# Patient Record
Sex: MALE | Race: ASIAN | NOT HISPANIC OR LATINO
[De-identification: names, ages, dates, MRNs, and addresses within clinical notes are randomized per-mention and may not be internally consistent; named-entity substitution may affect disease eponyms.]

---

## 2023-10-12 ENCOUNTER — LABORATORY RESULT (OUTPATIENT)
Age: 63
End: 2023-10-12

## 2023-10-12 ENCOUNTER — APPOINTMENT (OUTPATIENT)
Dept: BARIATRICS | Facility: CLINIC | Age: 63
End: 2023-10-12
Payer: COMMERCIAL

## 2023-10-12 VITALS
BODY MASS INDEX: 16.43 KG/M2 | DIASTOLIC BLOOD PRESSURE: 78 MMHG | WEIGHT: 108.38 LBS | SYSTOLIC BLOOD PRESSURE: 127 MMHG | HEIGHT: 68 IN | HEART RATE: 94 BPM | OXYGEN SATURATION: 98 % | TEMPERATURE: 98.3 F

## 2023-10-12 DIAGNOSIS — R19.00 INTRA-ABDOMINAL AND PELVIC SWELLING, MASS AND LUMP, UNSPECIFIED SITE: ICD-10-CM

## 2023-10-12 DIAGNOSIS — Z00.00 ENCOUNTER FOR GENERAL ADULT MEDICAL EXAMINATION W/OUT ABNORMAL FINDINGS: ICD-10-CM

## 2023-10-12 PROCEDURE — 99204 OFFICE O/P NEW MOD 45 MIN: CPT

## 2023-10-22 ENCOUNTER — INPATIENT (INPATIENT)
Facility: HOSPITAL | Age: 63
LOS: 4 days | Discharge: HOME | DRG: 374 | End: 2023-10-27
Attending: STUDENT IN AN ORGANIZED HEALTH CARE EDUCATION/TRAINING PROGRAM | Admitting: STUDENT IN AN ORGANIZED HEALTH CARE EDUCATION/TRAINING PROGRAM
Payer: COMMERCIAL

## 2023-10-22 VITALS
DIASTOLIC BLOOD PRESSURE: 84 MMHG | HEIGHT: 70 IN | TEMPERATURE: 98 F | HEART RATE: 108 BPM | SYSTOLIC BLOOD PRESSURE: 147 MMHG | RESPIRATION RATE: 18 BRPM | OXYGEN SATURATION: 98 % | WEIGHT: 110.01 LBS

## 2023-10-22 DIAGNOSIS — A41.9 SEPSIS, UNSPECIFIED ORGANISM: ICD-10-CM

## 2023-10-22 DIAGNOSIS — Z29.9 ENCOUNTER FOR PROPHYLACTIC MEASURES, UNSPECIFIED: ICD-10-CM

## 2023-10-22 DIAGNOSIS — N17.9 ACUTE KIDNEY FAILURE, UNSPECIFIED: ICD-10-CM

## 2023-10-22 DIAGNOSIS — K62.89 OTHER SPECIFIED DISEASES OF ANUS AND RECTUM: ICD-10-CM

## 2023-10-22 DIAGNOSIS — R65.10 SYSTEMIC INFLAMMATORY RESPONSE SYNDROME (SIRS) OF NON-INFECTIOUS ORIGIN WITHOUT ACUTE ORGAN DYSFUNCTION: ICD-10-CM

## 2023-10-22 DIAGNOSIS — R10.9 UNSPECIFIED ABDOMINAL PAIN: ICD-10-CM

## 2023-10-22 DIAGNOSIS — R74.01 ELEVATION OF LEVELS OF LIVER TRANSAMINASE LEVELS: ICD-10-CM

## 2023-10-22 LAB
ALBUMIN SERPL ELPH-MCNC: 3.2 G/DL — LOW (ref 3.3–5)
ALBUMIN SERPL ELPH-MCNC: 3.2 G/DL — LOW (ref 3.3–5)
ALP SERPL-CCNC: 746 U/L — HIGH (ref 40–120)
ALP SERPL-CCNC: 746 U/L — HIGH (ref 40–120)
ALT FLD-CCNC: 41 U/L — SIGNIFICANT CHANGE UP (ref 10–45)
ALT FLD-CCNC: 41 U/L — SIGNIFICANT CHANGE UP (ref 10–45)
ANION GAP SERPL CALC-SCNC: 15 MMOL/L — SIGNIFICANT CHANGE UP (ref 5–17)
ANION GAP SERPL CALC-SCNC: 15 MMOL/L — SIGNIFICANT CHANGE UP (ref 5–17)
ANISOCYTOSIS BLD QL: SLIGHT — SIGNIFICANT CHANGE UP
ANISOCYTOSIS BLD QL: SLIGHT — SIGNIFICANT CHANGE UP
APPEARANCE UR: CLEAR — SIGNIFICANT CHANGE UP
APPEARANCE UR: CLEAR — SIGNIFICANT CHANGE UP
AST SERPL-CCNC: 222 U/L — HIGH (ref 10–40)
AST SERPL-CCNC: 222 U/L — HIGH (ref 10–40)
BACTERIA # UR AUTO: ABNORMAL /HPF
BACTERIA # UR AUTO: ABNORMAL /HPF
BASOPHILS # BLD AUTO: 0 K/UL — SIGNIFICANT CHANGE UP (ref 0–0.2)
BASOPHILS # BLD AUTO: 0 K/UL — SIGNIFICANT CHANGE UP (ref 0–0.2)
BASOPHILS NFR BLD AUTO: 0 % — SIGNIFICANT CHANGE UP (ref 0–2)
BASOPHILS NFR BLD AUTO: 0 % — SIGNIFICANT CHANGE UP (ref 0–2)
BILIRUB SERPL-MCNC: 1.8 MG/DL — HIGH (ref 0.2–1.2)
BILIRUB SERPL-MCNC: 1.8 MG/DL — HIGH (ref 0.2–1.2)
BILIRUB UR-MCNC: NEGATIVE — SIGNIFICANT CHANGE UP
BILIRUB UR-MCNC: NEGATIVE — SIGNIFICANT CHANGE UP
BUN SERPL-MCNC: 23 MG/DL — SIGNIFICANT CHANGE UP (ref 7–23)
BUN SERPL-MCNC: 23 MG/DL — SIGNIFICANT CHANGE UP (ref 7–23)
CALCIUM SERPL-MCNC: 9.3 MG/DL — SIGNIFICANT CHANGE UP (ref 8.4–10.5)
CALCIUM SERPL-MCNC: 9.3 MG/DL — SIGNIFICANT CHANGE UP (ref 8.4–10.5)
CAST: 13 /LPF — HIGH (ref 0–4)
CAST: 13 /LPF — HIGH (ref 0–4)
CHLORIDE SERPL-SCNC: 95 MMOL/L — LOW (ref 96–108)
CHLORIDE SERPL-SCNC: 95 MMOL/L — LOW (ref 96–108)
CO2 SERPL-SCNC: 23 MMOL/L — SIGNIFICANT CHANGE UP (ref 22–31)
CO2 SERPL-SCNC: 23 MMOL/L — SIGNIFICANT CHANGE UP (ref 22–31)
COARSE GRAN CASTS #/AREA URNS AUTO: PRESENT
COARSE GRAN CASTS #/AREA URNS AUTO: PRESENT
COLOR SPEC: YELLOW — SIGNIFICANT CHANGE UP
COLOR SPEC: YELLOW — SIGNIFICANT CHANGE UP
CREAT SERPL-MCNC: 1.37 MG/DL — HIGH (ref 0.5–1.3)
CREAT SERPL-MCNC: 1.37 MG/DL — HIGH (ref 0.5–1.3)
DIFF PNL FLD: NEGATIVE — SIGNIFICANT CHANGE UP
DIFF PNL FLD: NEGATIVE — SIGNIFICANT CHANGE UP
EGFR: 59 ML/MIN/1.73M2 — LOW
EGFR: 59 ML/MIN/1.73M2 — LOW
EOSINOPHIL # BLD AUTO: 0 K/UL — SIGNIFICANT CHANGE UP (ref 0–0.5)
EOSINOPHIL # BLD AUTO: 0 K/UL — SIGNIFICANT CHANGE UP (ref 0–0.5)
EOSINOPHIL NFR BLD AUTO: 0 % — SIGNIFICANT CHANGE UP (ref 0–6)
EOSINOPHIL NFR BLD AUTO: 0 % — SIGNIFICANT CHANGE UP (ref 0–6)
GLUCOSE SERPL-MCNC: 93 MG/DL — SIGNIFICANT CHANGE UP (ref 70–99)
GLUCOSE SERPL-MCNC: 93 MG/DL — SIGNIFICANT CHANGE UP (ref 70–99)
GLUCOSE UR QL: NEGATIVE MG/DL — SIGNIFICANT CHANGE UP
GLUCOSE UR QL: NEGATIVE MG/DL — SIGNIFICANT CHANGE UP
HCT VFR BLD CALC: 37.3 % — LOW (ref 39–50)
HCT VFR BLD CALC: 37.3 % — LOW (ref 39–50)
HGB BLD-MCNC: 11.7 G/DL — LOW (ref 13–17)
HGB BLD-MCNC: 11.7 G/DL — LOW (ref 13–17)
HYPOCHROMIA BLD QL: SLIGHT — SIGNIFICANT CHANGE UP
HYPOCHROMIA BLD QL: SLIGHT — SIGNIFICANT CHANGE UP
KETONES UR-MCNC: 15 MG/DL
KETONES UR-MCNC: 15 MG/DL
LEUKOCYTE ESTERASE UR-ACNC: NEGATIVE — SIGNIFICANT CHANGE UP
LEUKOCYTE ESTERASE UR-ACNC: NEGATIVE — SIGNIFICANT CHANGE UP
LIDOCAIN IGE QN: 34 U/L — SIGNIFICANT CHANGE UP (ref 7–60)
LIDOCAIN IGE QN: 34 U/L — SIGNIFICANT CHANGE UP (ref 7–60)
LYMPHOCYTES # BLD AUTO: 0 % — LOW (ref 13–44)
LYMPHOCYTES # BLD AUTO: 0 % — LOW (ref 13–44)
LYMPHOCYTES # BLD AUTO: 0 K/UL — LOW (ref 1–3.3)
LYMPHOCYTES # BLD AUTO: 0 K/UL — LOW (ref 1–3.3)
MACROCYTES BLD QL: SLIGHT — SIGNIFICANT CHANGE UP
MACROCYTES BLD QL: SLIGHT — SIGNIFICANT CHANGE UP
MANUAL SMEAR VERIFICATION: SIGNIFICANT CHANGE UP
MANUAL SMEAR VERIFICATION: SIGNIFICANT CHANGE UP
MCHC RBC-ENTMCNC: 24.8 PG — LOW (ref 27–34)
MCHC RBC-ENTMCNC: 24.8 PG — LOW (ref 27–34)
MCHC RBC-ENTMCNC: 31.4 GM/DL — LOW (ref 32–36)
MCHC RBC-ENTMCNC: 31.4 GM/DL — LOW (ref 32–36)
MCV RBC AUTO: 79 FL — LOW (ref 80–100)
MCV RBC AUTO: 79 FL — LOW (ref 80–100)
MONOCYTES # BLD AUTO: 1.19 K/UL — HIGH (ref 0–0.9)
MONOCYTES # BLD AUTO: 1.19 K/UL — HIGH (ref 0–0.9)
MONOCYTES NFR BLD AUTO: 7 % — SIGNIFICANT CHANGE UP (ref 2–14)
MONOCYTES NFR BLD AUTO: 7 % — SIGNIFICANT CHANGE UP (ref 2–14)
MUCOUS THREADS # UR AUTO: PRESENT
MUCOUS THREADS # UR AUTO: PRESENT
NEUTROPHILS # BLD AUTO: 15.78 K/UL — HIGH (ref 1.8–7.4)
NEUTROPHILS # BLD AUTO: 15.78 K/UL — HIGH (ref 1.8–7.4)
NEUTROPHILS NFR BLD AUTO: 93 % — HIGH (ref 43–77)
NEUTROPHILS NFR BLD AUTO: 93 % — HIGH (ref 43–77)
NITRITE UR-MCNC: NEGATIVE — SIGNIFICANT CHANGE UP
NITRITE UR-MCNC: NEGATIVE — SIGNIFICANT CHANGE UP
NRBC # BLD: 1 /100 — HIGH (ref 0–0)
NRBC # BLD: 1 /100 — HIGH (ref 0–0)
NRBC # BLD: SIGNIFICANT CHANGE UP /100 WBCS (ref 0–0)
NRBC # BLD: SIGNIFICANT CHANGE UP /100 WBCS (ref 0–0)
OVALOCYTES BLD QL SMEAR: SLIGHT — SIGNIFICANT CHANGE UP
OVALOCYTES BLD QL SMEAR: SLIGHT — SIGNIFICANT CHANGE UP
PH UR: 5.5 — SIGNIFICANT CHANGE UP (ref 5–8)
PH UR: 5.5 — SIGNIFICANT CHANGE UP (ref 5–8)
PLAT MORPH BLD: ABNORMAL
PLAT MORPH BLD: ABNORMAL
PLATELET # BLD AUTO: 348 K/UL — SIGNIFICANT CHANGE UP (ref 150–400)
PLATELET # BLD AUTO: 348 K/UL — SIGNIFICANT CHANGE UP (ref 150–400)
POLYCHROMASIA BLD QL SMEAR: SLIGHT — SIGNIFICANT CHANGE UP
POLYCHROMASIA BLD QL SMEAR: SLIGHT — SIGNIFICANT CHANGE UP
POTASSIUM SERPL-MCNC: 4.9 MMOL/L — SIGNIFICANT CHANGE UP (ref 3.5–5.3)
POTASSIUM SERPL-MCNC: 4.9 MMOL/L — SIGNIFICANT CHANGE UP (ref 3.5–5.3)
POTASSIUM SERPL-SCNC: 4.9 MMOL/L — SIGNIFICANT CHANGE UP (ref 3.5–5.3)
POTASSIUM SERPL-SCNC: 4.9 MMOL/L — SIGNIFICANT CHANGE UP (ref 3.5–5.3)
PROT SERPL-MCNC: 8.2 G/DL — SIGNIFICANT CHANGE UP (ref 6–8.3)
PROT SERPL-MCNC: 8.2 G/DL — SIGNIFICANT CHANGE UP (ref 6–8.3)
PROT UR-MCNC: 30 MG/DL
PROT UR-MCNC: 30 MG/DL
RBC # BLD: 4.72 M/UL — SIGNIFICANT CHANGE UP (ref 4.2–5.8)
RBC # BLD: 4.72 M/UL — SIGNIFICANT CHANGE UP (ref 4.2–5.8)
RBC # FLD: 17 % — HIGH (ref 10.3–14.5)
RBC # FLD: 17 % — HIGH (ref 10.3–14.5)
RBC BLD AUTO: ABNORMAL
RBC BLD AUTO: ABNORMAL
RBC CASTS # UR COMP ASSIST: 2 /HPF — SIGNIFICANT CHANGE UP (ref 0–4)
RBC CASTS # UR COMP ASSIST: 2 /HPF — SIGNIFICANT CHANGE UP (ref 0–4)
SODIUM SERPL-SCNC: 133 MMOL/L — LOW (ref 135–145)
SODIUM SERPL-SCNC: 133 MMOL/L — LOW (ref 135–145)
SP GR SPEC: 1.02 — SIGNIFICANT CHANGE UP (ref 1–1.03)
SP GR SPEC: 1.02 — SIGNIFICANT CHANGE UP (ref 1–1.03)
SQUAMOUS # UR AUTO: 4 /HPF — SIGNIFICANT CHANGE UP (ref 0–5)
SQUAMOUS # UR AUTO: 4 /HPF — SIGNIFICANT CHANGE UP (ref 0–5)
UROBILINOGEN FLD QL: 1 MG/DL — SIGNIFICANT CHANGE UP (ref 0.2–1)
UROBILINOGEN FLD QL: 1 MG/DL — SIGNIFICANT CHANGE UP (ref 0.2–1)
WBC # BLD: 16.97 K/UL — HIGH (ref 3.8–10.5)
WBC # BLD: 16.97 K/UL — HIGH (ref 3.8–10.5)
WBC # FLD AUTO: 16.97 K/UL — HIGH (ref 3.8–10.5)
WBC # FLD AUTO: 16.97 K/UL — HIGH (ref 3.8–10.5)
WBC UR QL: 2 /HPF — SIGNIFICANT CHANGE UP (ref 0–5)
WBC UR QL: 2 /HPF — SIGNIFICANT CHANGE UP (ref 0–5)

## 2023-10-22 PROCEDURE — 99285 EMERGENCY DEPT VISIT HI MDM: CPT

## 2023-10-22 PROCEDURE — 99223 1ST HOSP IP/OBS HIGH 75: CPT

## 2023-10-22 PROCEDURE — 74177 CT ABD & PELVIS W/CONTRAST: CPT | Mod: 26,MG

## 2023-10-22 PROCEDURE — G1004: CPT

## 2023-10-22 RX ORDER — ACETAMINOPHEN 500 MG
650 TABLET ORAL EVERY 6 HOURS
Refills: 0 | Status: DISCONTINUED | OUTPATIENT
Start: 2023-10-22 | End: 2023-10-27

## 2023-10-22 RX ORDER — LANOLIN ALCOHOL/MO/W.PET/CERES
3 CREAM (GRAM) TOPICAL AT BEDTIME
Refills: 0 | Status: DISCONTINUED | OUTPATIENT
Start: 2023-10-22 | End: 2023-10-27

## 2023-10-22 RX ORDER — SODIUM CHLORIDE 9 MG/ML
1000 INJECTION INTRAMUSCULAR; INTRAVENOUS; SUBCUTANEOUS ONCE
Refills: 0 | Status: COMPLETED | OUTPATIENT
Start: 2023-10-22 | End: 2023-10-22

## 2023-10-22 RX ORDER — HEPARIN SODIUM 5000 [USP'U]/ML
5000 INJECTION INTRAVENOUS; SUBCUTANEOUS EVERY 12 HOURS
Refills: 0 | Status: DISCONTINUED | OUTPATIENT
Start: 2023-10-22 | End: 2023-10-22

## 2023-10-22 RX ORDER — INFLUENZA VIRUS VACCINE 15; 15; 15; 15 UG/.5ML; UG/.5ML; UG/.5ML; UG/.5ML
0.5 SUSPENSION INTRAMUSCULAR ONCE
Refills: 0 | Status: DISCONTINUED | OUTPATIENT
Start: 2023-10-22 | End: 2023-10-27

## 2023-10-22 RX ORDER — MORPHINE SULFATE 50 MG/1
4 CAPSULE, EXTENDED RELEASE ORAL ONCE
Refills: 0 | Status: DISCONTINUED | OUTPATIENT
Start: 2023-10-22 | End: 2023-10-22

## 2023-10-22 RX ORDER — IOHEXOL 300 MG/ML
30 INJECTION, SOLUTION INTRAVENOUS ONCE
Refills: 0 | Status: COMPLETED | OUTPATIENT
Start: 2023-10-22 | End: 2023-10-22

## 2023-10-22 RX ORDER — ONDANSETRON 8 MG/1
4 TABLET, FILM COATED ORAL EVERY 8 HOURS
Refills: 0 | Status: DISCONTINUED | OUTPATIENT
Start: 2023-10-22 | End: 2023-10-27

## 2023-10-22 RX ADMIN — SODIUM CHLORIDE 1000 MILLILITER(S): 9 INJECTION INTRAMUSCULAR; INTRAVENOUS; SUBCUTANEOUS at 12:15

## 2023-10-22 RX ADMIN — IOHEXOL 30 MILLILITER(S): 300 INJECTION, SOLUTION INTRAVENOUS at 12:15

## 2023-10-22 RX ADMIN — MORPHINE SULFATE 4 MILLIGRAM(S): 50 CAPSULE, EXTENDED RELEASE ORAL at 17:19

## 2023-10-22 NOTE — ED PROVIDER NOTE - OBJECTIVE STATEMENT
62 y/o m no reported pmh presents c/o right side abd pain for the past 2 weeks.  Pt stating he saw Dr. Eliel العلي recently who recommended he come to ED for a CT of his abdomen.  Pt stating he hasn't been taking anything for pain which is worse over the past few days and feels his abdomen more firm.  Pt denies fever, chills, vomiting, diarrhea, CP, SOB, urinary sx, all other ROS negative. 62 y/o m no reported pmh presents c/o right side abd pain for the past 2 weeks.  Pt stating he saw Dr. Eliel العلي recently who had planned on an outpatient CT a/p given his complaints but the pt hadn't had it done yet, stating his pain was worsening and having firmness on the right side of his abdomen so came to ED.  Pt reports 30lb unintentional weight loss in the past month, hasn't been eating much as any PO intake causes pain and satiety.  Pt reports nausea but no vomiting.  Denies fever, chills, diarrhea, CP, SOB, all other ROS negative.

## 2023-10-22 NOTE — CONSULT NOTE ADULT - NSCONSULTADDITIONALINFOA_GEN_ALL_CORE
****Senior Surgical Resident Addendum****    Agree with A/P. Briefly, 64yo M, insignificant PMx/PSx, with approx 4 weeks of abd pain, with unintentional 30lbs weight loss. Initially thought s/s were 2/2 hernia, recently seen in [facility] (Eliel 10/12) and was referred for CT C/A/P + labs w tumor markers (CEA >9K, CA 19.9 (5930) though was not able to get imaging done. Comes into ER tonight 2/2 inc in abd pain. Upon arrival, afebrile, sinus tachy to 108 improved following fluid resuscitation, normotensive, abd softly distended, generalized mild TTP w/o R/G, or signs of peritonitis. Anal verge w/nl, appropriate sphincter tone, palpable mass 3-4cm within anal canal at 11oclock position, empty rectal vault. Labs notable for wbc 16.97, Hgb 11.7, HCT 37.3, w PMN left shift, hyponatremia (133), hypochloremia (95), Cr 1.37, Tbili 1.8, , , ALT 41. CT A/P w PO/IV contrast w 3.4cm rectal mass, 4cm from verge, with innumerable hepatic lesions concerning for mets, with 2.1cm periportal nodes, no evidence of obstruction or perforation. Highly suspicious for metastatic rectal cancer. Admitted to medical service, to complete staging, please repeat tumor markers (CEA, AFP, CA 19.9, ), consult GI service for Colonoscopy, rectal EUS, IR for bx of liver lesions. In interim, please ascertain CT chest tonight. Will need pelvic MRI eventually. CRS to follow. Discussed w attending surgeon on call.

## 2023-10-22 NOTE — ED PROVIDER NOTE - PROGRESS NOTE DETAILS
surgery evaluated in ED and reports no acute surgical intervention needed at this time, recommends GI eval, medicine w/u, will admit as pt with significant weight loss, not tolerating PO

## 2023-10-22 NOTE — ED ADULT NURSE NOTE - NSFALLUNIVINTERV_ED_ALL_ED
Bed/Stretcher in lowest position, wheels locked, appropriate side rails in place/Call bell, personal items and telephone in reach/Instruct patient to call for assistance before getting out of bed/chair/stretcher/Non-slip footwear applied when patient is off stretcher/[place] to call system/Physically safe environment - no spills, clutter or unnecessary equipment/Purposeful proactive rounding/Room/bathroom lighting operational, light cord in reach

## 2023-10-22 NOTE — H&P ADULT - PROBLEM SELECTOR PLAN 2
CT abd and pelvis with rectal mass concerning for malignancy, periportal lymphadenopathy, and multiple liver lesions. Pt following with Dr. Julian and had elevated CEA and CA19 on outpatient labs. Concern for malignancy given lesions in liver.   - GI vs IR consult for possible biopsy  - heme-onc consult 4 weeks of R sided abd pain and early satiety. Follows with Dr. Julian outpatient. CT abd/pelv with multiple liver lesions and rectal mass. Surgery consulted in ED with no surgical intervention at this time. Pt passing gas and BM, low suspicion for SBO. s/p 1L NS bolus  - GI consult  - maintenance fluids 4 weeks of R sided abd pain and early satiety. Follows with Dr. Julian outpatient. CT abd/pelv with multiple liver lesions and rectal mass. Surgery consulted in ED with no surgical intervention at this time. Pt passing gas and BM, low suspicion for SBO. s/p 1L NS bolus  - GI consult  - maintenance fluids  - pain regimen: 4 weeks of R sided abd pain and early satiety. Follows with Dr. Julian outpatient. CT abd/pelv with multiple liver lesions and rectal mass. Surgery consulted in ED with no surgical intervention at this time. Pt passing gas and BM, low suspicion for SBO. s/p 1L NS bolus and 4mg morphine IV x1  - GI consult for possible colonoscopy

## 2023-10-22 NOTE — H&P ADULT - ATTENDING COMMENTS
64 yo M with no known PMHx px from home with several week hx of progressively worsening abd pain with associated nausea/vomiting, and poor PO intake found to have new rectal mass suspicious for malignancy with hepatic lesions on CT scan admitted for further work-up.      #Rectal mass – Initially presented to outpatient surgery Dr. Julian with concern for abd pain with plan for CTAP to evaluate for hernia which had not yet been completed. CEA and CA 19 elevated outpatient. On current presentation, CTAP in ED showing innumerable masses in enlarged liver, consistent with extensive hepatic metastatic disease and low rectal mass that is suspicious for rectal cancer, small-moderate ascites, periportal LAD. Surgery consulted in ED, no acute surgical intervention recommended at this time, recommending CT Chest to complete staging, repeat tumor markers. GI consult for colonoscopy to further evaluate rectal mass.      #Elevated liver enzymes – Likely in setting of enlarged liver with metastatic lesions. Avoid hepatotoxic agents. IR consult for liver bx. Hold chemical DVT ppx.      Agree with remainder of resident plan as above.

## 2023-10-22 NOTE — H&P ADULT - NSHPPHYSICALEXAM_GEN_ALL_CORE
T(C): 37.6 (10-22-23 @ 16:47), Max: 37.6 (10-22-23 @ 16:47)  HR: 101 (10-22-23 @ 16:47) (101 - 108)  BP: 154/83 (10-22-23 @ 16:47) (147/84 - 154/83)  RR: 18 (10-22-23 @ 16:47) (18 - 18)  SpO2: 97% (10-22-23 @ 16:47) (97% - 98%)    CONSTITUTIONAL: cachectic, well groomed, no apparent distress  EYES: EOMI  ENMT: dry lips  RESP: No respiratory distress, no use of accessory muscles; CTA b/l, no WRR  CV: tachycardic, regular rhythm, s1/s2  GI: RUQ tense, soft abd otherwise. no tenderness to palpation  MSK: 5/5  strength b/l  SKIN: No rashes or ulcers noted; no subcutaneous nodules or induration palpable  NEURO: AOx3

## 2023-10-22 NOTE — H&P ADULT - NSHPLABSRESULTS_GEN_ALL_CORE
11.7   16.97 )-----------( 348      ( 22 Oct 2023 12:04 )             37.3     10-22    133<L>  |  95<L>  |  23  ----------------------------<  93  4.9   |  23  |  1.37<H>    Ca    9.3      22 Oct 2023 12:04    TPro  8.2  /  Alb  3.2<L>  /  TBili  1.8<H>  /  DBili  x   /  AST  222<H>  /  ALT  41  /  AlkPhos  746<H>  10-22      CT Abdomen and Pelvis w/ Oral Cont and w/ IV Cont     IMPRESSION:  1. Innumerable masses in enlarged liver, consistent with extensive   hepatic metastatic disease.    2. There is a low rectal mass that is suspicious for rectal cancer.   Recommend further evaluation with colonoscopy.    3. Small to moderate ascites.    4. Small right pleural effusion.    5. Moderate periportal lymphadenopathy.

## 2023-10-22 NOTE — H&P ADULT - PROBLEM SELECTOR PLAN 6
D: regular  E: replete Mg <2, Phosp <3, K <4  F: 1L NS bolus  DVT ppx: heparin subq  Code: Full, palliative consult for further GOC conversation D: regular  E: replete Mg <2, Phosp <3, K <4  F: 1L NS bolus  DVT ppx: hold for possible IR procedure  Code: Full

## 2023-10-22 NOTE — ED ADULT NURSE NOTE - OBJECTIVE STATEMENT
Pt arrived to ED c/o abdominal pain. Pt reports RUQ x15 days with nausea and decreased PO intake. Pt reports no loss of appetite but RUQ pain when eating. Pt reported 20lbs unintentional weight loss over 2 months. Denies vomiting, diarrhea, cp, sob or urinary sx.

## 2023-10-22 NOTE — H&P ADULT - PROBLEM SELECTOR PLAN 5
D: regular  E: replete Mg <2, Phosp <3, K <4  F: 1L NS bolus  DVT ppx: lovenox 40  Code: Full, palliative consult for further GOC conversation D: regular  E: replete Mg <2, Phosp <3, K <4  F: 1L NS bolus  DVT ppx: heparin subq  Code: Full, palliative consult for further GOC conversation Cr elevated to 1.37, (Labs from 10/12 1.09). UA with casts. possibly in setting of poor PO intake.   - f/u urine studies to determine etiology  - consider maintenance fluids Cr elevated to 1.37, (Labs from 10/12 1.09). UA with casts. possibly in setting of poor PO intake. s/p 1L NS  - continue to trend Cr

## 2023-10-22 NOTE — CONSULT NOTE ADULT - SUBJECTIVE AND OBJECTIVE BOX
SURGERY CONSULT  ==============================================================================================================  HPI: Patient is a 63 year old gentleman without significant medical history who recetly saw Dr. Julian for initial evaluation of abdominal bulge. History significant for unintentional weight loss approximately 30 lbs in one month which he attributed to dietary modifications. He also endorsed a new abdominal bulge and persistent worsening cough one month prior. He has not seen a physician in many years, has never had colonoscopy. Patient denies fevers, chills, chest pain, dyspnea, dysuria, hematochezia, melena. No other complaints.   Dr. Julian referred him for further work up and imaging, and PCP/GI follow up for colonoscopy.        (22 Oct 2023 17:06)      PAST MEDICAL & SURGICAL HISTORY:  No pertinent past medical history        Home Meds: Home Medications:    Allergies: Allergies    No Known Allergies    Intolerances      Soc:   Advanced Directives: Presumed Full Code     CURRENT MEDICATIONS:   --------------------------------------------------------------------------------------  Neurologic Medications  acetaminophen     Tablet .. 650 milliGRAM(s) Oral every 6 hours PRN Temp greater or equal to 38C (100.4F), Mild Pain (1 - 3)  melatonin 3 milliGRAM(s) Oral at bedtime PRN Insomnia  ondansetron Injectable 4 milliGRAM(s) IV Push every 8 hours PRN Nausea and/or Vomiting    Respiratory Medications    Cardiovascular Medications    Gastrointestinal Medications  aluminum hydroxide/magnesium hydroxide/simethicone Suspension 30 milliLiter(s) Oral every 4 hours PRN Dyspepsia    Genitourinary Medications    Hematologic/Oncologic Medications    Antimicrobial/Immunologic Medications    Endocrine/Metabolic Medications    Topical/Other Medications    --------------------------------------------------------------------------------------    VITAL SIGNS, INS/OUTS (last 24 hours):  --------------------------------------------------------------------------------------  ICU Vital Signs Last 24 Hrs  T(C): 36.8 (22 Oct 2023 19:51), Max: 37.6 (22 Oct 2023 16:47)  T(F): 98.2 (22 Oct 2023 19:51), Max: 99.7 (22 Oct 2023 16:47)  HR: 102 (22 Oct 2023 19:51) (100 - 108)  BP: 137/81 (22 Oct 2023 19:51) (137/81 - 154/83)  BP(mean): 100 (22 Oct 2023 19:51) (100 - 100)  ABP: --  ABP(mean): --  RR: 19 (22 Oct 2023 19:51) (18 - 19)  SpO2: 99% (22 Oct 2023 19:51) (95% - 99%)    O2 Parameters below as of 22 Oct 2023 19:51  Patient On (Oxygen Delivery Method): room air          I&O's Summary    --------------------------------------------------------------------------------------    EXAM:  Constitutional: Cachectic, in no acute distress  Eyes: PERRL, EOMI, no conjunctival infection, icteric sclera   ENT: pharynx is unremarkable, moist mucus membrane, no oral lesions.   Neck: supple without JVD, no thyromegaly or masses appreciated.   Pulmonary: Clear to auscultation bilaterally, appropriate sat in room air  Cardiac: RRR, no murmurs; S1, S2  Abdomen:. Upper abdomen firm, concern for intraabdominal mass, otherwise soft, nontender, nondistended, no rebound or guarding, no hernia appreciated.   Anorectal: Palpable friable mass in 11 o'clock position with blood on gloved finger after palpation   Lymphatic: no peripheral adenopathy appreciated.   Back: normal spine exam without palpable tenderness, no kyphosis or scoliosis.   Musculoskeletal: full range of motion and no deformities appreciated.   Skin: Jaundice, no rashes    Neurology: A & O x 4. No gross FND    Psychiatric: Mood & affect appropriate.         LABS  --------------------------------------------------------------------------------------  Labs:  CAPILLARY BLOOD GLUCOSE                              11.7   16.97 )-----------( 348      ( 22 Oct 2023 12:04 )             37.3       Auto Neutrophil %: 93.0 % (10-22-23 @ 12:04)    10-22    133<L>  |  95<L>  |  23  ----------------------------<  93  4.9   |  23  |  1.37<H>      Calcium: 9.3 mg/dL (10-22-23 @ 12:04)      LFTs:             8.2  | 1.8  | 222      ------------------[746     ( 22 Oct 2023 12:04 )  3.2  | x    | 41          Lipase:34     Amylase:x             Coags:            Urinalysis Basic - ( 22 Oct 2023 12:05 )    Color: Yellow / Appearance: Clear / S.019 / pH: x  Gluc: x / Ketone: 15 mg/dL  / Bili: Negative / Urobili: 1.0 mg/dL   Blood: x / Protein: 30 mg/dL / Nitrite: Negative   Leuk Esterase: Negative / RBC: 2 /HPF / WBC 2 /HPF   Sq Epi: x / Non Sq Epi: 4 /HPF / Bacteria: Few /HPF        Urinalysis with Rflx Culture (collected 22 Oct 2023 12:05)        --------------------------------------------------------------------------------------    OTHER LABS    IMAGING RESULTS  CT ABDOMEN-PELVIS     FINDINGS:  LOWER CHEST: Small right pleural effusion and associated passive atelectasis of a small portion of the right lower lobe.    LIVER: Innumerable hypodense masses throughout enlarged liver, consistent with extensive hepatic metastatic disease.  BILE DUCTS: Normal caliber.  GALLBLADDER: Within normal limits.  SPLEEN: Within normal limits.  PANCREAS: Within normal limits.  ADRENALS: Within normal limits.  KIDNEYS/URETERS: Within normal limits.    BLADDER: Within normal limits.  REPRODUCTIVE ORGANS: Prostate within normal limits.    BOWEL: 3.4 cm low rectal mass at 4.0 cm from anal verge. This is an annular mass along the right anterolateral wall of the rectum (3/136). No bowel obstruction. Appendix is normal.  PERITONEUM: Small to moderate ascites.  VESSELS: The hepatic veins and the left portal vein are displaced by and mildly narrowed by the liver masses, but are patent. Atherosclerotic changes of the aorta and pelvic arteries.  RETROPERITONEUM/LYMPH NODES: 2.1 cm periportal lymph node.  ABDOMINAL WALL: Small inguinal hernias bilaterally. Anasarca.  BONES: Degenerative changes.    IMPRESSION:  1. Innumerable masses in enlarged liver, consistent with extensive hepatic metastatic disease.    2. There is a low rectal mass that is suspicious for rectal cancer. Recommend further evaluation with colonoscopy.    3. Small to moderate ascites.    4. Small right pleural effusion.    5. Moderate periportal lymphadenopathy

## 2023-10-22 NOTE — H&P ADULT - ASSESSMENT
64 yo with no PMH presenting with 4 weeks of abdominal pain and early satiety. Found to have multiple lesions in liver concerning for metastasis and meet 2/4 SIRS criteria (tachy and elevated WBC). Admitted for management of infection vs malignancy.

## 2023-10-22 NOTE — ED ADULT TRIAGE NOTE - CHIEF COMPLAINT QUOTE
Pt co RUQ abdominal pain x15 days associated with nausea and decreased PO intake. +20lb unintentional weight loss over past 2 months d/t decreased PO intake. Denies vomiting, diarrhea, urinary sx, CP, SOB.

## 2023-10-22 NOTE — H&P ADULT - HISTORY OF PRESENT ILLNESS
Denies fevers, chills, cough, chest pain, dyspnea, nausea, headache, diarrhea, sick contactschange in urinary or bowel habits      In the ED:    - VS: Tmax: 98.1 , HR: 108 , BP: 147/84 , RR: 18  , O2:  98  % on RA     - Pertinent Labs: WBC 16.97 (neutrophil predominance), Hgb 11.7, Na 133, Cr 1.37, Alk Phosp 746, , ALT 41, UA with casts    - Imaging:   CT Abdomen and Pelvis w/ Oral Cont and w/ IV Cont:     1. Innumerable masses in enlarged liver, consistent with extensive   hepatic metastatic disease.    2. There is a low rectal mass that is suspicious for rectal cancer.   Recommend further evaluation with colonoscopy.    3. Small to moderate ascites.    4. Small right pleural effusion.    5. Moderate periportal lymphadenopathy.      - Treatment/interventions: 1L IV bolus         64 yo M with no PMH (pt has not seen a doctor in 30 years since moving to [place]) presents with 4 weeks of abdominal pain, progressively worsened over the last few days. Pt reports the abd pain started around his umbilicus and has since spread to the right side of his abd. Pt saw Dr. Julian outpatient on 10/12 as he was concerned mass was a hernia. Dr. Julian recommended CT abd/pelv and ordered CEA (which was elevated to 9094) and CA 19 (which was elevated to 5930). Pt reports over the last 4 weeks he has been experiencing early satiety, dry  mouth, and bitter taste. He also reports 2 weeks experiencing gastritis like symptoms such as nausea and vomiting. He reports less frequent stools but denies any blood, foul odor, or discoloration. Pt denies any tobacco, alcohol, or IVDU use.       In the ED:    - VS: Tmax: 98.1 , HR: 108 , BP: 147/84 , RR: 18  , O2:  98  % on RA     - Pertinent Labs: WBC 16.97 (neutrophil predominance), Hgb 11.7, Na 133, Cr 1.37, Alk Phosp 746, , ALT 41, UA with casts    - Imaging:   CT Abdomen and Pelvis w/ Oral Cont and w/ IV Cont:   1. Innumerable masses in enlarged liver, consistent with extensive   hepatic metastatic disease  2. There is a low rectal mass that is suspicious for rectal cancer.   Recommend further evaluation with colonoscopy.  3. Small to moderate ascites.  4. Small right pleural effusion.  5. Moderate periportal lymphadenopathy      - Treatment/interventions: 1L IV bolus         62 yo M with no PMH (pt has not seen a doctor in 30 years since moving to [place]) presents with 4 weeks of abdominal pain, progressively worsened over the last few days. Pt reports the abd pain started around his umbilicus and has since spread to the right side of his abd. Pt saw Dr. Julian outpatient on 10/12 as he was concerned mass was a hernia. Dr. Julian recommended CT abd/pelv and ordered CEA (which was elevated to 9094) and CA 19 (which was elevated to 5930). Pt reports over the last 4 weeks he has been experiencing early satiety, dry  mouth, and bitter taste. He also reports 2 weeks experiencing gastritis like symptoms such as nausea and vomiting. He reports less frequent stools but denies any blood, foul odor, or discoloration. Pt denies any tobacco, alcohol, or IVDU use.       In the ED:    - VS: Tmax: 98.1 , HR: 108 , BP: 147/84 , RR: 18  , O2:  98  % on RA     - Pertinent Labs: WBC 16.97 (neutrophil predominance), Hgb 11.7, Na 133, Cr 1.37, Alk Phosp 746, , ALT 41, UA with casts    - Imaging:   CT Abdomen and Pelvis w/ Oral Cont and w/ IV Cont:   1. Innumerable masses in enlarged liver, consistent with extensive   hepatic metastatic disease  2. There is a low rectal mass that is suspicious for rectal cancer.   Recommend further evaluation with colonoscopy.  3. Small to moderate ascites.  4. Small right pleural effusion.  5. Moderate periportal lymphadenopathy      - Treatment/interventions: 1L IV bolus, surg consulted         62 yo M with no PMH (pt has not seen a doctor in 30 years since moving to [place]) presents with 4 weeks of abdominal pain, progressively worsened over the last few days. Pt reports the abd pain started around his umbilicus and has since spread to the right side of his abd. Pt saw Dr. Julian outpatient on 10/12 as he was concerned mass was a hernia. Dr. Julian recommended CT abd/pelv and ordered CEA (which was elevated to 9094) and CA 19 (which was elevated to 5930). Pt reports over the last 4 weeks he has been experiencing early satiety, dry  mouth, and bitter taste. He also reports 2 weeks experiencing gastritis like symptoms such as nausea and vomiting. He reports less frequent stools but denies any blood, foul odor, or discoloration. Pt denies any tobacco, alcohol, or IVDU use.       In the ED:    - VS: Tmax: 98.1 , HR: 108 , BP: 147/84 , RR: 18  , O2:  98  % on RA     - Pertinent Labs: WBC 16.97 (neutrophil predominance), Hgb 11.7, Na 133, Cr 1.37, Alk Phosp 746, , ALT 41, UA with casts    - Imaging:   CT Abdomen and Pelvis w/ Oral Cont and w/ IV Cont:   1. Innumerable masses in enlarged liver, consistent with extensive   hepatic metastatic disease  2. There is a low rectal mass that is suspicious for rectal cancer.   Recommend further evaluation with colonoscopy.  3. Small to moderate ascites.  4. Small right pleural effusion.  5. Moderate periportal lymphadenopathy      - Treatment/interventions: 1L IV bolus, morphine 4mg IV x1 surg consulted

## 2023-10-22 NOTE — ED PROVIDER NOTE - CONSTITUTIONAL, MLM
normal... Well appearing, awake, alert, oriented to person, place, time/situation and in no apparent distress. frail ill appearing male lying in stretcher

## 2023-10-22 NOTE — CONSULT NOTE ADULT - ASSESSMENT
64 yo M w/ no significant PMHx who recently saw Dr. Julian in clinic for initial evaluation of abdominal bulge and significant unintentional weight loss. On exam, afebrile, hemodynamically stable, abdomen soft in lower quadrants but firm in upper abdomen with concern for abdominal mass, no hernia appreciated, non tender, nondistended, no rebound or guarding. Labs notable for leukocytosis with L shift, transaminitis, elevated ALP and hyperbilrubinemia. CT A/P with PO & IV contrast notable for low rectal mass s/f rectal ca, hepatomegaly w/ innumerable masses c/w extensive mets,     Plan/Recs:   - Obtain CEA level   - CT chest tonight   - GI consult tonight; will need colonoscopy asap   - IR consult for biopsy   - MRI pelvis & endorectal ultrasound   - Colorectal surgery (team 5c) following     Seen and d/w chief resident. Attending aware and agrees with plan    62 yo M w/ no significant PMHx who recently saw Dr. Julian in clinic for initial evaluation of abdominal bulge and significant unintentional weight loss. On exam, afebrile, hemodynamically stable, abdomen soft in lower quadrants but firm in upper abdomen with concern for abdominal mass, no hernia appreciated, non tender, nondistended, no rebound or guarding. Labs notable for leukocytosis with L shift, transaminitis, elevated ALP and hyperbilrubinemia. CT A/P with PO & IV contrast notable for low rectal mass s/f rectal ca, hepatomegaly w/ innumerable masses c/w extensive mets,     Plan/Recs:   - Obtain CEA level   - CT chest tonight   - GI consult tonight; will need colonoscopy asap   - IR consult for biopsy   - MRI pelvis & endorectal ultrasound   - Tumor markers  - Colorectal surgery (team 5c) following     Seen and d/w chief resident. Attending aware and agrees with plan

## 2023-10-22 NOTE — H&P ADULT - PROBLEM SELECTOR PLAN 1
Pt meets 2/4 SIRS (Tachy 108 and WBC 16.97 with neutrophilic predominance). CT abd/pelvis with small pleural effusion and moderate size ascites. UA with few bacteria. low suspicion for UTI, pneumonia. Possibly gastritis given abd and loss of appetite however low suspicion. No recent travel. s/p 1L NS bolus  - monitor off abx given concern for malignancy over infectious process Pt meets 2/4 SIRS (Tachy 108 and WBC 16.97 with neutrophilic predominance). CT abd/pelvis with small pleural effusion and moderate size ascites. UA with few bacteria. low suspicion for UTI, pneumonia. Possible SBP given small to moderate ascites seen on CT vs gastritis given abd and loss of appetite however low suspicion. No recent travel. s/p 1L NS bolus  - start CTX 1g q24 Pt meets 2/4 SIRS (Tachy 108 and WBC 16.97 with neutrophilic predominance). Unclear source. CT abd/pelvis with small pleural effusion and moderate size ascites. UA with few bacteria. low suspicion for UTI, pneumonia. Possible SBP given small to moderate ascites seen on CT however less likely given pt afebrile and NT on exam vs gastritis given abd and loss of appetite. No recent travel. s/p 1L NS bolus  - monitor off abx or consider starting CTX 1g q24 Pt meets 2/4 SIRS (Tachy 108 and WBC 16.97 with neutrophilic predominance). Unclear source. CT abd/pelvis with small pleural effusion and moderate size ascites. UA with few bacteria. low suspicion for UTI, pneumonia. Possible SBP given small to moderate ascites seen on CT however less likely given pt afebrile and NT on exam vs gastritis given abd and loss of appetite. No recent travel. Likely reactive in setting of malignancy. s/p 1L NS bolus  - monitor off abx

## 2023-10-22 NOTE — ED PROVIDER NOTE - ATTENDING APP SHARED VISIT CONTRIBUTION OF CARE
62 y/o m no reported pmh presents c/o right side abd pain for the past 2 weeks, sent by Dr. Julian for Ct imaging, ttp on exam, will check labs, ct, reassess.

## 2023-10-22 NOTE — ED ADULT TRIAGE NOTE - AS TEMP SITE
Regarding: yeast infection/would like Rx  ----- Message from Natacha Hernandez sent at 9/1/2017 10:02 PM CDT -----  Patient Name: Amber Beckford  Specialist or PCP:[facility]  Pregnant (If Yes, how long?):  Symptoms:yeast infection/would like Rx  Call Back #:[phone]  Is the patient’s permanent residence located in [place], [place], or a compact State? Yes [place] [address]  Call Center Account #:[id number]       oral

## 2023-10-22 NOTE — H&P ADULT - PROBLEM SELECTOR PLAN 4
Cr elevated to 1.37, (Labs from 10/12 1.09). UA with casts. possibly in setting of poor PO intake.   - f/u urine studies to determine etiology  - consider maintenance fluids CT abd and pelvis with rectal mass concerning for malignancy, periportal lymphadenopathy, and multiple liver lesions. Pt following with Dr. Julian and had elevated CEA and CA19 on outpatient labs. Concern for malignancy given lesions in liver.   - GI vs IR consult for possible biopsy  - heme-onc consult

## 2023-10-22 NOTE — H&P ADULT - PROBLEM SELECTOR PLAN 3
Pt with elevated alk phos 746 (elevated from 10/12 bloodwork) and , ALT 41. CT abd with liver lesions. likely secondary to metastatic lesions vs infectious process Pt with elevated alk phos 746 (elevated from 10/12 bloodwork) and , ALT 41. CT abd with liver lesions. likely secondary to metastatic lesions vs infectious process. Pt denies getting hepatits B vaccinee or IVDU  - f/u complete hepatitis panel Pt with elevated alk phos 746 (elevated from 10/12 bloodwork) and , ALT 41. CT abd with liver lesions. likely secondary to metastatic lesions vs infectious process. Pt denies getting hepatits B vaccinee or IVDU  - f/u complete hepatitis panel  - f/u coags Pt with elevated alk phos 746 (elevated from 10/12 bloodwork) and , ALT 41. CT abd with liver lesions and small to moderate ascites. likely secondary to metastatic lesions vs infectious process. Pt denies getting hepatits B vaccine or IVDU  - f/u complete hepatitis panel  - f/u coags Pt with elevated alk phos 746 (elevated from 10/12 bloodwork) and , ALT 41. CT abd with liver lesions and small to moderate ascites. likely secondary to metastatic lesions vs infectious process. Pt denies getting hepatits B vaccine or IVDU  - IR consult for liver biopsy  - f/u complete hepatitis panel  - f/u coags

## 2023-10-22 NOTE — ED PROVIDER NOTE - CLINICAL SUMMARY MEDICAL DECISION MAKING FREE TEXT BOX
62 y/o m presents c/o right side abd pain over the past few weeks.  Pt afebrile in ED, abdomen firm on exam in right upper quadrant, no rebound tenderness.  Labs abnormal with leukocytosis 16.97, total bili 1.8, alk phos 746, AST//41, normal lipase.  CT a/p done, pending result.  Will f/u CT a/p and likely consult surgery.

## 2023-10-22 NOTE — ED ADULT TRIAGE NOTE - NS ED TRIAGE AVPU SCALE
Alert-The patient is alert, awake and responds to voice. The patient is oriented to time, place, and person. The triage nurse is able to obtain subjective information. I personally performed the service described in the documentation recorded by the scribe in my presence, and it accurately and completely records my words and actions.

## 2023-10-23 ENCOUNTER — RESULT REVIEW (OUTPATIENT)
Age: 63
End: 2023-10-23

## 2023-10-23 DIAGNOSIS — Z71.89 OTHER SPECIFIED COUNSELING: ICD-10-CM

## 2023-10-23 DIAGNOSIS — K62.89 OTHER SPECIFIED DISEASES OF ANUS AND RECTUM: ICD-10-CM

## 2023-10-23 DIAGNOSIS — G89.3 NEOPLASM RELATED PAIN (ACUTE) (CHRONIC): ICD-10-CM

## 2023-10-23 DIAGNOSIS — N17.9 ACUTE KIDNEY FAILURE, UNSPECIFIED: ICD-10-CM

## 2023-10-23 DIAGNOSIS — R74.01 ELEVATION OF LEVELS OF LIVER TRANSAMINASE LEVELS: ICD-10-CM

## 2023-10-23 DIAGNOSIS — Z29.9 ENCOUNTER FOR PROPHYLACTIC MEASURES, UNSPECIFIED: ICD-10-CM

## 2023-10-23 DIAGNOSIS — N17.0 ACUTE KIDNEY FAILURE WITH TUBULAR NECROSIS: ICD-10-CM

## 2023-10-23 DIAGNOSIS — R53.81 OTHER MALAISE: ICD-10-CM

## 2023-10-23 DIAGNOSIS — Z51.5 ENCOUNTER FOR PALLIATIVE CARE: ICD-10-CM

## 2023-10-23 DIAGNOSIS — R10.9 UNSPECIFIED ABDOMINAL PAIN: ICD-10-CM

## 2023-10-23 DIAGNOSIS — R65.10 SYSTEMIC INFLAMMATORY RESPONSE SYNDROME (SIRS) OF NON-INFECTIOUS ORIGIN WITHOUT ACUTE ORGAN DYSFUNCTION: ICD-10-CM

## 2023-10-23 DIAGNOSIS — R11.0 NAUSEA: ICD-10-CM

## 2023-10-23 LAB
AFP-TM SERPL-MCNC: <1.8 NG/ML — SIGNIFICANT CHANGE UP
AFP-TM SERPL-MCNC: <1.8 NG/ML — SIGNIFICANT CHANGE UP
ALBUMIN SERPL ELPH-MCNC: 2.9 G/DL — LOW (ref 3.3–5)
ALBUMIN SERPL ELPH-MCNC: 2.9 G/DL — LOW (ref 3.3–5)
ALP SERPL-CCNC: 624 U/L — HIGH (ref 40–120)
ALP SERPL-CCNC: 624 U/L — HIGH (ref 40–120)
ALT FLD-CCNC: 37 U/L — SIGNIFICANT CHANGE UP (ref 10–45)
ALT FLD-CCNC: 37 U/L — SIGNIFICANT CHANGE UP (ref 10–45)
ANION GAP SERPL CALC-SCNC: 14 MMOL/L — SIGNIFICANT CHANGE UP (ref 5–17)
ANION GAP SERPL CALC-SCNC: 14 MMOL/L — SIGNIFICANT CHANGE UP (ref 5–17)
APTT BLD: 27.6 SEC — SIGNIFICANT CHANGE UP (ref 24.5–35.6)
APTT BLD: 27.6 SEC — SIGNIFICANT CHANGE UP (ref 24.5–35.6)
AST SERPL-CCNC: 209 U/L — HIGH (ref 10–40)
AST SERPL-CCNC: 209 U/L — HIGH (ref 10–40)
BASOPHILS # BLD AUTO: 0.06 K/UL — SIGNIFICANT CHANGE UP (ref 0–0.2)
BASOPHILS # BLD AUTO: 0.06 K/UL — SIGNIFICANT CHANGE UP (ref 0–0.2)
BASOPHILS NFR BLD AUTO: 0.4 % — SIGNIFICANT CHANGE UP (ref 0–2)
BASOPHILS NFR BLD AUTO: 0.4 % — SIGNIFICANT CHANGE UP (ref 0–2)
BILIRUB SERPL-MCNC: 1.7 MG/DL — HIGH (ref 0.2–1.2)
BILIRUB SERPL-MCNC: 1.7 MG/DL — HIGH (ref 0.2–1.2)
BLD GP AB SCN SERPL QL: NEGATIVE — SIGNIFICANT CHANGE UP
BLD GP AB SCN SERPL QL: NEGATIVE — SIGNIFICANT CHANGE UP
BUN SERPL-MCNC: 24 MG/DL — HIGH (ref 7–23)
BUN SERPL-MCNC: 24 MG/DL — HIGH (ref 7–23)
CALCIUM SERPL-MCNC: 9.4 MG/DL — SIGNIFICANT CHANGE UP (ref 8.4–10.5)
CALCIUM SERPL-MCNC: 9.4 MG/DL — SIGNIFICANT CHANGE UP (ref 8.4–10.5)
CANCER AG125 SERPL-ACNC: 78 U/ML — HIGH
CANCER AG125 SERPL-ACNC: 78 U/ML — HIGH
CANCER AG19-9 SERPL-ACNC: 8339 U/ML — HIGH
CANCER AG19-9 SERPL-ACNC: 8339 U/ML — HIGH
CEA SERPL-MCNC: HIGH NG/ML (ref 0–3.8)
CEA SERPL-MCNC: HIGH NG/ML (ref 0–3.8)
CHLORIDE SERPL-SCNC: 97 MMOL/L — SIGNIFICANT CHANGE UP (ref 96–108)
CHLORIDE SERPL-SCNC: 97 MMOL/L — SIGNIFICANT CHANGE UP (ref 96–108)
CO2 SERPL-SCNC: 21 MMOL/L — LOW (ref 22–31)
CO2 SERPL-SCNC: 21 MMOL/L — LOW (ref 22–31)
CREAT SERPL-MCNC: 1.28 MG/DL — SIGNIFICANT CHANGE UP (ref 0.5–1.3)
CREAT SERPL-MCNC: 1.28 MG/DL — SIGNIFICANT CHANGE UP (ref 0.5–1.3)
EGFR: 64 ML/MIN/1.73M2 — SIGNIFICANT CHANGE UP
EGFR: 64 ML/MIN/1.73M2 — SIGNIFICANT CHANGE UP
EOSINOPHIL # BLD AUTO: 0.13 K/UL — SIGNIFICANT CHANGE UP (ref 0–0.5)
EOSINOPHIL # BLD AUTO: 0.13 K/UL — SIGNIFICANT CHANGE UP (ref 0–0.5)
EOSINOPHIL NFR BLD AUTO: 0.8 % — SIGNIFICANT CHANGE UP (ref 0–6)
EOSINOPHIL NFR BLD AUTO: 0.8 % — SIGNIFICANT CHANGE UP (ref 0–6)
GLUCOSE SERPL-MCNC: 74 MG/DL — SIGNIFICANT CHANGE UP (ref 70–99)
GLUCOSE SERPL-MCNC: 74 MG/DL — SIGNIFICANT CHANGE UP (ref 70–99)
HAV IGM SER-ACNC: SIGNIFICANT CHANGE UP
HAV IGM SER-ACNC: SIGNIFICANT CHANGE UP
HBV CORE AB SER-ACNC: SIGNIFICANT CHANGE UP
HBV CORE AB SER-ACNC: SIGNIFICANT CHANGE UP
HBV CORE IGM SER-ACNC: SIGNIFICANT CHANGE UP
HBV CORE IGM SER-ACNC: SIGNIFICANT CHANGE UP
HBV SURFACE AB SER-ACNC: SIGNIFICANT CHANGE UP
HBV SURFACE AB SER-ACNC: SIGNIFICANT CHANGE UP
HBV SURFACE AG SER-ACNC: SIGNIFICANT CHANGE UP
HBV SURFACE AG SER-ACNC: SIGNIFICANT CHANGE UP
HCT VFR BLD CALC: 34 % — LOW (ref 39–50)
HCT VFR BLD CALC: 34 % — LOW (ref 39–50)
HCV AB S/CO SERPL IA: 0.09 S/CO — SIGNIFICANT CHANGE UP
HCV AB SERPL-IMP: SIGNIFICANT CHANGE UP
HGB BLD-MCNC: 10.5 G/DL — LOW (ref 13–17)
HGB BLD-MCNC: 10.5 G/DL — LOW (ref 13–17)
IMM GRANULOCYTES NFR BLD AUTO: 0.9 % — SIGNIFICANT CHANGE UP (ref 0–0.9)
IMM GRANULOCYTES NFR BLD AUTO: 0.9 % — SIGNIFICANT CHANGE UP (ref 0–0.9)
INR BLD: 1.1 — SIGNIFICANT CHANGE UP (ref 0.85–1.18)
INR BLD: 1.1 — SIGNIFICANT CHANGE UP (ref 0.85–1.18)
LYMPHOCYTES # BLD AUTO: 1.47 K/UL — SIGNIFICANT CHANGE UP (ref 1–3.3)
LYMPHOCYTES # BLD AUTO: 1.47 K/UL — SIGNIFICANT CHANGE UP (ref 1–3.3)
LYMPHOCYTES # BLD AUTO: 9 % — LOW (ref 13–44)
LYMPHOCYTES # BLD AUTO: 9 % — LOW (ref 13–44)
MAGNESIUM SERPL-MCNC: 2.5 MG/DL — SIGNIFICANT CHANGE UP (ref 1.6–2.6)
MAGNESIUM SERPL-MCNC: 2.5 MG/DL — SIGNIFICANT CHANGE UP (ref 1.6–2.6)
MCHC RBC-ENTMCNC: 24.6 PG — LOW (ref 27–34)
MCHC RBC-ENTMCNC: 24.6 PG — LOW (ref 27–34)
MCHC RBC-ENTMCNC: 30.9 GM/DL — LOW (ref 32–36)
MCHC RBC-ENTMCNC: 30.9 GM/DL — LOW (ref 32–36)
MCV RBC AUTO: 79.8 FL — LOW (ref 80–100)
MCV RBC AUTO: 79.8 FL — LOW (ref 80–100)
MONOCYTES # BLD AUTO: 1.57 K/UL — HIGH (ref 0–0.9)
MONOCYTES # BLD AUTO: 1.57 K/UL — HIGH (ref 0–0.9)
MONOCYTES NFR BLD AUTO: 9.6 % — SIGNIFICANT CHANGE UP (ref 2–14)
MONOCYTES NFR BLD AUTO: 9.6 % — SIGNIFICANT CHANGE UP (ref 2–14)
NEUTROPHILS # BLD AUTO: 13.04 K/UL — HIGH (ref 1.8–7.4)
NEUTROPHILS # BLD AUTO: 13.04 K/UL — HIGH (ref 1.8–7.4)
NEUTROPHILS NFR BLD AUTO: 79.3 % — HIGH (ref 43–77)
NEUTROPHILS NFR BLD AUTO: 79.3 % — HIGH (ref 43–77)
NRBC # BLD: 0 /100 WBCS — SIGNIFICANT CHANGE UP (ref 0–0)
NRBC # BLD: 0 /100 WBCS — SIGNIFICANT CHANGE UP (ref 0–0)
PHOSPHATE SERPL-MCNC: 2.9 MG/DL — SIGNIFICANT CHANGE UP (ref 2.5–4.5)
PHOSPHATE SERPL-MCNC: 2.9 MG/DL — SIGNIFICANT CHANGE UP (ref 2.5–4.5)
PLATELET # BLD AUTO: 278 K/UL — SIGNIFICANT CHANGE UP (ref 150–400)
PLATELET # BLD AUTO: 278 K/UL — SIGNIFICANT CHANGE UP (ref 150–400)
POTASSIUM SERPL-MCNC: 4.5 MMOL/L — SIGNIFICANT CHANGE UP (ref 3.5–5.3)
POTASSIUM SERPL-MCNC: 4.5 MMOL/L — SIGNIFICANT CHANGE UP (ref 3.5–5.3)
POTASSIUM SERPL-SCNC: 4.5 MMOL/L — SIGNIFICANT CHANGE UP (ref 3.5–5.3)
POTASSIUM SERPL-SCNC: 4.5 MMOL/L — SIGNIFICANT CHANGE UP (ref 3.5–5.3)
PROT SERPL-MCNC: 7.6 G/DL — SIGNIFICANT CHANGE UP (ref 6–8.3)
PROT SERPL-MCNC: 7.6 G/DL — SIGNIFICANT CHANGE UP (ref 6–8.3)
PROTHROM AB SERPL-ACNC: 12.5 SEC — SIGNIFICANT CHANGE UP (ref 9.5–13)
PROTHROM AB SERPL-ACNC: 12.5 SEC — SIGNIFICANT CHANGE UP (ref 9.5–13)
RBC # BLD: 4.26 M/UL — SIGNIFICANT CHANGE UP (ref 4.2–5.8)
RBC # BLD: 4.26 M/UL — SIGNIFICANT CHANGE UP (ref 4.2–5.8)
RBC # FLD: 18.2 % — HIGH (ref 10.3–14.5)
RBC # FLD: 18.2 % — HIGH (ref 10.3–14.5)
RH IG SCN BLD-IMP: POSITIVE — SIGNIFICANT CHANGE UP
RH IG SCN BLD-IMP: POSITIVE — SIGNIFICANT CHANGE UP
SODIUM SERPL-SCNC: 132 MMOL/L — LOW (ref 135–145)
SODIUM SERPL-SCNC: 132 MMOL/L — LOW (ref 135–145)
WBC # BLD: 16.41 K/UL — HIGH (ref 3.8–10.5)
WBC # BLD: 16.41 K/UL — HIGH (ref 3.8–10.5)
WBC # FLD AUTO: 16.41 K/UL — HIGH (ref 3.8–10.5)
WBC # FLD AUTO: 16.41 K/UL — HIGH (ref 3.8–10.5)

## 2023-10-23 PROCEDURE — 99152 MOD SED SAME PHYS/QHP 5/>YRS: CPT

## 2023-10-23 PROCEDURE — 99223 1ST HOSP IP/OBS HIGH 75: CPT

## 2023-10-23 PROCEDURE — 99232 SBSQ HOSP IP/OBS MODERATE 35: CPT | Mod: GC

## 2023-10-23 PROCEDURE — 47000 NEEDLE BIOPSY OF LIVER PERQ: CPT

## 2023-10-23 PROCEDURE — 88342 IMHCHEM/IMCYTCHM 1ST ANTB: CPT | Mod: 26

## 2023-10-23 PROCEDURE — 76942 ECHO GUIDE FOR BIOPSY: CPT | Mod: 26

## 2023-10-23 PROCEDURE — 88305 TISSUE EXAM BY PATHOLOGIST: CPT | Mod: 26

## 2023-10-23 PROCEDURE — 99497 ADVNCD CARE PLAN 30 MIN: CPT | Mod: 25

## 2023-10-23 PROCEDURE — 88173 CYTOPATH EVAL FNA REPORT: CPT | Mod: 26

## 2023-10-23 PROCEDURE — 99222 1ST HOSP IP/OBS MODERATE 55: CPT

## 2023-10-23 PROCEDURE — 88341 IMHCHEM/IMCYTCHM EA ADD ANTB: CPT | Mod: 26

## 2023-10-23 RX ORDER — SOD SULF/SODIUM/NAHCO3/KCL/PEG
4000 SOLUTION, RECONSTITUTED, ORAL ORAL ONCE
Refills: 0 | Status: COMPLETED | OUTPATIENT
Start: 2023-10-23 | End: 2023-10-23

## 2023-10-23 RX ORDER — MORPHINE SULFATE 50 MG/1
15 CAPSULE, EXTENDED RELEASE ORAL EVERY 4 HOURS
Refills: 0 | Status: DISCONTINUED | OUTPATIENT
Start: 2023-10-23 | End: 2023-10-25

## 2023-10-23 RX ORDER — MORPHINE SULFATE 50 MG/1
15 CAPSULE, EXTENDED RELEASE ORAL
Refills: 0 | Status: DISCONTINUED | OUTPATIENT
Start: 2023-10-23 | End: 2023-10-23

## 2023-10-23 RX ORDER — MORPHINE SULFATE 50 MG/1
2 CAPSULE, EXTENDED RELEASE ORAL EVERY 6 HOURS
Refills: 0 | Status: DISCONTINUED | OUTPATIENT
Start: 2023-10-23 | End: 2023-10-23

## 2023-10-23 RX ORDER — MORPHINE SULFATE 50 MG/1
4 CAPSULE, EXTENDED RELEASE ORAL EVERY 4 HOURS
Refills: 0 | Status: DISCONTINUED | OUTPATIENT
Start: 2023-10-23 | End: 2023-10-25

## 2023-10-23 RX ORDER — HYDROMORPHONE HYDROCHLORIDE 2 MG/ML
0.2 INJECTION INTRAMUSCULAR; INTRAVENOUS; SUBCUTANEOUS ONCE
Refills: 0 | Status: DISCONTINUED | OUTPATIENT
Start: 2023-10-23 | End: 2023-10-23

## 2023-10-23 RX ADMIN — Medication 4000 MILLILITER(S): at 17:41

## 2023-10-23 NOTE — DIETITIAN NUTRITION RISK NOTIFICATION - TREATMENT: THE FOLLOWING DIET HAS BEEN RECOMMENDED
Diet, Regular (10-22-23 @ 19:33) [Active]  Diet, NPO after Midnight:      NPO Start Date: 22-Oct-2023,   NPO Start Time: 23:59 (10-22-23 @ 19:33) [Active]

## 2023-10-23 NOTE — CONSULT NOTE ADULT - NS ATTEST RISK PROBLEM GEN_ALL_CORE FT
Prescription Of Parenteral Controlled Substances    Chronic Illness With Severe Exacerbation/Progression

## 2023-10-23 NOTE — CONSULT NOTE ADULT - ATTENDING COMMENTS
Pt w/ 4 weeks of sx as above, found to have rectal mass and metastatic lesions in his liver. Liver lesions biopsied today by IR.  Plan for colonoscopy and biopsy tomorrow.  CLD today, prep, NPO @ [place].

## 2023-10-23 NOTE — DIETITIAN INITIAL EVALUATION ADULT - PROBLEM SELECTOR PLAN 2
4 weeks of R sided abd pain and early satiety. Follows with Dr. Julian outpatient. CT abd/pelv with multiple liver lesions and rectal mass. Surgery consulted in ED with no surgical intervention at this time. Pt passing gas and BM, low suspicion for SBO. s/p 1L NS bolus and 4mg morphine IV x1  - GI consult for possible colonoscopy

## 2023-10-23 NOTE — CONSULT NOTE ADULT - PROBLEM SELECTOR RECOMMENDATION 2
.  prior to admission. since resolved. likely metabolic iso cancer.   - recommend zofran 4-8 mg PO q6 PRN nausea  - if qtc >500, recommend tigan 200 mg IM q8 PRN

## 2023-10-23 NOTE — PROGRESS NOTE ADULT - ATTENDING COMMENTS
Pt is 64 y/o male with no pmh presented to ED w/ abdominal pain for 4 weeks. Pt has lost 20 lbs in past 8 weeks and has complained of "pencil like" stools. CT ab/pelv on arrival revealed low rectal mass and hepatomegaly w/ innumerable masses c/w extensive mets.    #Rectal mass   #Liver mets     - Liver biopsy today by IR.   - Colonoscopy tomorrow by GI   - Palliative consulted for pain management   - CEA, CA19 and  elevated, likely rectal Ca w/ mets to the liver.   - Surgery recs appreciated     Rest as per resident note Pt is 64 y/o male with no pmh presented to ED w/ abdominal pain for 4 weeks. Pt has lost 20 lbs in past 8 weeks and has complained of "pencil like" stools. CT ab/pelv on arrival revealed low rectal mass and hepatomegaly w/ innumerable masses c/w extensive mets.    #Rectal mass   #Liver mets     - Liver biopsy today by IR.   - Colonoscopy tomorrow by GI   - Palliative consulted for pain management   - CEA, CA19 and  elevated, likely rectal Ca w/ mets to the liver.   - heme/onc consult to establish care and further work up outpatient   - Surgery recs appreciated     Rest as per resident note

## 2023-10-23 NOTE — DIETITIAN INITIAL EVALUATION ADULT - PROBLEM SELECTOR PLAN 5
Cr elevated to 1.37, (Labs from 10/12 1.09). UA with casts. possibly in setting of poor PO intake. s/p 1L NS  - continue to trend Cr

## 2023-10-23 NOTE — DIETITIAN INITIAL EVALUATION ADULT - PROBLEM SELECTOR PLAN 4
CT abd and pelvis with rectal mass concerning for malignancy, periportal lymphadenopathy, and multiple liver lesions. Pt following with Dr. Julian and had elevated CEA and CA19 on outpatient labs. Concern for malignancy given lesions in liver.   - GI vs IR consult for possible biopsy  - heme-onc consult

## 2023-10-23 NOTE — CONSULT NOTE ADULT - PROBLEM SELECTOR RECOMMENDATION 4
.  CT abd and pelvis with rectal mass concerning for malignancy, periportal lymphadenopathy, and multiple liver lesions. Ecog 2.   - pending IR biopsy and possible colonoscopy  - If no further disease modifying treatments offered or patient/family declined further disease modifying treatments,  patient would qualify for hospice

## 2023-10-23 NOTE — CONSULT NOTE ADULT - CONVERSATION DETAILS
Met with patient at bedside Met with patient at bedside to discuss diagnosis, prognosis, and treatment preferences. Introduced the role of palliative medicine for ACP, GOC, and support.    Reviewed hospital course. Pt understands that he has a mass that is concerning for infection vs malignancy. Explained that workup is strongly suggestive of metastatic rectal cancer given innumerable liver lesions on imaging. We discussed next steps including liver biopsy and colonoscopy. If/when malignancy confirmed, explained the likely palliative - not curative- intent of any DMTx that may be offered. Reviewed role of performance and nutritional statuses in order to receive DMTx.     Pt is coping well with this news, guided by strong spiritual taco. Pt without family here in the [place], but is supported by co workers. Pt has not completed HCP. Explained role and circumstances when form would come into effect. Form completed and placed in physical chart; pt electing his sister, Elidia Barbour [phone].     Pt plans to continue comprehensive workup and explore DMTx options that may be available to him. He is also considering returning to [facility] to pursue treatment as he will be better supported by family.     Questions answered, support provided.

## 2023-10-23 NOTE — CONSULT NOTE ADULT - PROBLEM SELECTOR RECOMMENDATION 9
.  mixed somatic/visceral RUQ pain likely 2/2 mechanical stretch and tissue injury of liver iso diffuse liver lesions cf metastatic disease. Ineffective relief with prn tylenol at home.   - recommend ms 7.5 mg PO q4 PRN mild pain  - recommend  ms 15 mg PO q4 PRN moderate pain  - recommend ms 4 mg IVP q4 PRN severe pain   - Hold opioids for somnolence or RR<12   - recommend senna 2 tabs PO qHS standing, mLax in the morning   - Pending heme/onc recs for further workup of rectal mass

## 2023-10-23 NOTE — CONSULT NOTE ADULT - SUBJECTIVE AND OBJECTIVE BOX
64 yo M with no PMH (pt has not seen a doctor in 30 years since moving to [place]) presents with 4 weeks of abdominal pain, progressively worsened over the last few days. Pt reports the abd pain started around his umbilicus and has since spread to the right side of his abd. Pt saw Dr. Julian outpatient on 10/12 as he was concerned mass was a hernia. Dr. Julian recommended CT abd/pelv and ordered CEA (which was elevated to 9094) and CA 19 (which was elevated to 5930). Ct showing large rectal mass and multiple liver mets. IR consulted for biopsy of liver mass.     Clinical History: RECTAL MASS    Handoff    MEWS Score    No pertinent past medical history    Rectal mass    Sepsis    JOSEFINA (acute kidney injury)    Transaminitis    Prophylactic measure    Rectal mass    SIRS (systemic inflammatory response syndrome)    Abdominal pain    ABD PAIN    [email or web address]        Meds:acetaminophen     Tablet .. 650 milliGRAM(s) Oral every 6 hours PRN  aluminum hydroxide/magnesium hydroxide/simethicone Suspension 30 milliLiter(s) Oral every 4 hours PRN  influenza   Vaccine 0.5 milliLiter(s) IntraMuscular once  melatonin 3 milliGRAM(s) Oral at bedtime PRN  ondansetron Injectable 4 milliGRAM(s) IV Push every 8 hours PRN      Allergies:No Known Allergies        Labs:                           10.5   16.41 )-----------( 278      ( 23 Oct 2023 05:30 )             34.0     PT/INR - ( 23 Oct 2023 05:30 )   PT: 12.5 sec;   INR: 1.10          PTT - ( 23 Oct 2023 05:30 )  PTT:27.6 sec  10-23    132<L>  |  97  |  24<H>  ----------------------------<  74  4.5   |  21<L>  |  1.28    Ca    9.4      23 Oct 2023 05:30  Phos  2.9     10-23  Mg     2.5     10-23    TPro  7.6  /  Alb  2.9<L>  /  TBili  1.7<H>  /  DBili  x   /  AST  209<H>  /  ALT  37  /  AlkPhos  624<H>  10-23          Imaging Findings: Innumerable masses in enlarged liver, consistent with extensive hepatic metastatic disease. There is a low rectal mass that is suspicious for rectal cancer.

## 2023-10-23 NOTE — CONSULT NOTE ADULT - SUBJECTIVE AND OBJECTIVE BOX
Hematology Oncology Consult Note      HPI:  64 yo M with no PMH (pt has not seen a doctor in 30 years since moving to [place]) presents with 4 weeks of abdominal pain, progressively worsened over the last few days. Pt reports the abd pain started around his umbilicus and has since spread to the right side of his abd. Pt saw Dr. Julian outpatient on 10/12 as he was concerned mass was a hernia. Dr. Julian recommended CT abd/pelv and ordered CEA (which was elevated to 9094) and CA 19 (which was elevated to 5930). Pt reports over the last 4 weeks he has been experiencing early satiety, dry  mouth, and bitter taste. He also reports 2 weeks experiencing gastritis like symptoms such as nausea and vomiting. He reports less frequent stools but denies any blood, foul odor, or discoloration. Pt denies any tobacco, alcohol, or IVDU use.       In the ED:    - VS: Tmax: 98.1 , HR: 108 , BP: 147/84 , RR: 18  , O2:  98  % on RA     - Pertinent Labs: WBC 16.97 (neutrophil predominance), Hgb 11.7, Na 133, Cr 1.37, Alk Phosp 746, , ALT 41, UA with casts    - Imaging:   CT Abdomen and Pelvis w/ Oral Cont and w/ IV Cont:   1. Innumerable masses in enlarged liver, consistent with extensive   hepatic metastatic disease  2. There is a low rectal mass that is suspicious for rectal cancer.   Recommend further evaluation with colonoscopy.  3. Small to moderate ascites.  4. Small right pleural effusion.  5. Moderate periportal lymphadenopathy      - Treatment/interventions: 1L IV bolus, morphine 4mg IV x1 surg consulted   (22 Oct 2023 17:06)      Interval History: s/p liver biopsy    SUBJECTIVE: Patient seen and examined at bedside. Has a feeling of early satiety due to mass in mid abdomen. Eating less over past year, 20 lb weight loss though states intentional. Denies fever, headache, nausea, vomiting, chest pain, shortness of breath, abdominal pain, changes in bowel movements or urination.     OBJECTIVE:    VITAL SIGNS:  ICU Vital Signs Last 24 Hrs  T(C): 36.5 (23 Oct 2023 11:31), Max: 37.5 (23 Oct 2023 05:28)  T(F): 97.7 (23 Oct 2023 11:31), Max: 99.5 (23 Oct 2023 05:28)  HR: 99 (23 Oct 2023 11:31) (99 - 105)  BP: 127/80 (23 Oct 2023 11:31) (127/80 - 141/76)  BP(mean): 95 (23 Oct 2023 11:31) (95 - 100)  ABP: --  ABP(mean): --  RR: 18 (23 Oct 2023 11:31) (18 - 19)  SpO2: 98% (23 Oct 2023 11:31) (95% - 99%)    O2 Parameters below as of 23 Oct 2023 11:31  Patient On (Oxygen Delivery Method): room air              CAPILLARY BLOOD GLUCOSE          PHYSICAL EXAM:  General: cachectic, chronically ill appearing, answering questions, pleasantly conversant  HEENT: NC/AT; PERRL, clear conjunctiva  Neck: supple  Respiratory: CTA b/l  Cardiovascular: +S1/S2; RRR  Abdomen: soft, midabdominal and RUQ exam c/w enlarged liver  Extremities: WWP, 2+ peripheral pulses b/l; no LE edema  Skin: normal color and turgor; no rash  Neurological: AAOx3    MEDICATIONS:  MEDICATIONS  (STANDING):  influenza   Vaccine 0.5 milliLiter(s) IntraMuscular once    MEDICATIONS  (PRN):  acetaminophen     Tablet .. 650 milliGRAM(s) Oral every 6 hours PRN Temp greater or equal to 38C (100.4F), Mild Pain (1 - 3)  aluminum hydroxide/magnesium hydroxide/simethicone Suspension 30 milliLiter(s) Oral every 4 hours PRN Dyspepsia  melatonin 3 milliGRAM(s) Oral at bedtime PRN Insomnia  morphine  - Injectable 4 milliGRAM(s) IV Push every 4 hours PRN Severe Pain (7 - 10)  morphine  IR 15 milliGRAM(s) Oral every 4 hours PRN Moderate Pain (4 - 6)  ondansetron Injectable 4 milliGRAM(s) IV Push every 8 hours PRN Nausea and/or Vomiting      MEDICAL/SURGICAL Hx:  PAST MEDICAL & SURGICAL HISTORY:  No pertinent past medical history          ALLERGIES:  Allergies    No Known Allergies    Intolerances        LABS:                        10.5   16.41 )-----------( 278      ( 23 Oct 2023 05:30 )             34.0     10-23    132<L>  |  97  |  24<H>  ----------------------------<  74  4.5   |  21<L>  |  1.28    Ca    9.4      23 Oct 2023 05:30  Phos  2.9     10-23  Mg     2.5     10-23    TPro  7.6  /  Alb  2.9<L>  /  TBili  1.7<H>  /  DBili  x   /  AST  209<H>  /  ALT  37  /  AlkPhos  624<H>  10-23    PT/INR - ( 23 Oct 2023 05:30 )   PT: 12.5 sec;   INR: 1.10          PTT - ( 23 Oct 2023 05:30 )  PTT:27.6 sec  Urinalysis Basic - ( 23 Oct 2023 05:30 )    Color: x / Appearance: x / SG: x / pH: x  Gluc: 74 mg/dL / Ketone: x  / Bili: x / Urobili: x   Blood: x / Protein: x / Nitrite: x   Leuk Esterase: x / RBC: x / WBC x   Sq Epi: x / Non Sq Epi: x / Bacteria: x            RADIOLOGY, PATHOLOGY, & ADDITIONAL TESTS: Reviewed.

## 2023-10-23 NOTE — PROGRESS NOTE ADULT - SUBJECTIVE AND OBJECTIVE BOX
No acute            No acute overnight events reported.    Pt seen and examined at bedside this morning. Pt reports 3/10 abdominal pain, was 9/10 yesterday. Admits that mouth still feels dry and has bitter taste. Admits to 20 lb weight loss over 2 months and recent thin "pencil like" stools. Denies fever, chills, headache, chest pain, SOB, palp., racing of heart, N/V/D, dysuria.   Vital Signs Last 24 Hrs  T(C): 37.5 (23 Oct 2023 05:28), Max: 37.6 (22 Oct 2023 16:47)  T(F): 99.5 (23 Oct 2023 05:28), Max: 99.7 (22 Oct 2023 16:47)  HR: 105 (23 Oct 2023 05:28) (100 - 108)  BP: 134/80 (23 Oct 2023 05:28) (134/80 - 154/83)  BP(mean): 100 (22 Oct 2023 19:51) (100 - 100)  RR: 18 (23 Oct 2023 05:28) (18 - 19)  SpO2: 98% (23 Oct 2023 05:28) (95% - 99%)    Parameters below as of 23 Oct 2023 05:28  Patient On (Oxygen Delivery Method): room air    FH: mother  from colon cancer in mid 70's    Labs: wbc 16.97-->16.41, hgb 11.7-->10.5, PT 12.5, INR 1.10, PTT 27.6, Na 133-->132, BUN 24, Cr 1.37-->1.28, Hep A, B and C all non-reactive    OVERNIGHT EVENTS/INTERVAL HPI: No acute overnight events reported. Pt seen and examined at bedside this morning. Pt reports 3/10 abdominal pain, was 9/10 yesterday. Admits that mouth still feels dry and has bitter taste. Admits to 20 lb weight loss over 2 months and recent thin "pencil like" stools. Denies fever, chills, headache, chest pain, SOB, palp., racing of heart, N/V/D, dysuria.     REVIEW OF SYSTEMS:  All other review of systems is negative unless indicated above.    OBJECTIVE:  T(C): 36.5 (10-23-23 @ 11:31), Max: 37.6 (10-22-23 @ 16:47)  HR: 99 (10-23-23 @ 11:31) (99 - 105)  BP: 127/80 (10-23-23 @ 11:31) (127/80 - 154/83)  RR: 18 (10-23-23 @ 11:31) (18 - 19)  SpO2: 98% (10-23-23 @ 11:31) (95% - 99%)  Daily Height in cm: 177.8 (22 Oct 2023 19:51)    Daily Weight in k (22 Oct 2023 19:51)    Physical Exam:  General: cachectic  Eyes: EOMI intact bilaterally  HENT: dry mucous membranes  Lungs: CTA B/L. No wheezes, rales, or rhonchi  Cardiovascular: RRR. No murmurs, rubs, or gallops  Abdomen: RUQ hardened, otherwise abd soft NT/ND  Extremities: WWP, No clubbing, cyanosis or edema  MSK: 5/5  strength b/l  Neurological: Alert and oriented x3  Skin: Warm and dry. No obvious rash     Medications:  MEDICATIONS  (STANDING):  influenza   Vaccine 0.5 milliLiter(s) IntraMuscular once    MEDICATIONS  (PRN):  acetaminophen     Tablet .. 650 milliGRAM(s) Oral every 6 hours PRN Temp greater or equal to 38C (100.4F), Mild Pain (1 - 3)  aluminum hydroxide/magnesium hydroxide/simethicone Suspension 30 milliLiter(s) Oral every 4 hours PRN Dyspepsia  melatonin 3 milliGRAM(s) Oral at bedtime PRN Insomnia  ondansetron Injectable 4 milliGRAM(s) IV Push every 8 hours PRN Nausea and/or Vomiting      Labs:                        10.5   16.41 )-----------( 278      ( 23 Oct 2023 05:30 )             34.0     10-23    132<L>  |  97  |  24<H>  ----------------------------<  74  4.5   |  21<L>  |  1.28    Ca    9.4      23 Oct 2023 05:30  Phos  2.9     10-23  Mg     2.5     10-23    TPro  7.6  /  Alb  2.9<L>  /  TBili  1.7<H>  /  DBili  x   /  AST  209<H>  /  ALT  37  /  AlkPhos  624<H>  10-23    PT/INR - ( 23 Oct 2023 05:30 )   PT: 12.5 sec;   INR: 1.10          PTT - ( 23 Oct 2023 05:30 )  PTT:27.6 sec  Urinalysis Basic - ( 23 Oct 2023 05:30 )    Color: x / Appearance: x / SG: x / pH: x  Gluc: 74 mg/dL / Ketone: x  / Bili: x / Urobili: x   Blood: x / Protein: x / Nitrite: x   Leuk Esterase: x / RBC: x / WBC x   Sq Epi: x / Non Sq Epi: x / Bacteria: x             CT Abdomen and Pelvis w/ Oral Cont and w/ IV Cont (10.22.23 @ 14:50) >  IMPRESSION:  1. Innumerable masses in enlarged liver, consistent with extensive   hepatic metastatic disease.    2. There is a low rectal mass that is suspicious for rectal cancer.   Recommend further evaluation with colonoscopy.    3. Small to moderate ascites.    4. Small right pleural effusion.    5. Moderate periportal lymphadenopathy.

## 2023-10-23 NOTE — PROGRESS NOTE ADULT - PROBLEM SELECTOR PLAN 6
D: regular  E: replete Mg <2, Phosp <3, K <4  F: 1L NS bolus  DVT ppx: hold for IR procedure  Code: Full

## 2023-10-23 NOTE — DIETITIAN INITIAL EVALUATION ADULT - OTHER CALCULATIONS
Based on Standards of Care patient <100% IBW (66%) thus actual body weight used for all calculations (110#). Needs adjusted for malnutrition, clinical course. Fluid recs per team in view of hyponatremia.

## 2023-10-23 NOTE — CONSULT NOTE ADULT - ASSESSMENT
63 M with no known PMHx (pt has not seen a doctor in 30 years since moving to [place]) who presents with 4 weeks of abdominal pain, progressively worsened over the last few days. Found to have rectal mass and suspected liver metastasis. Oncology consulted for suspected metastatic rectal cancer.    Oncologic History:  - Initially presented to [facility] ED with 4 weeks of abd pain (10/22/23), reported intentional weight loss over past year 135lbs (stable weight for 30 years) ->110lbs. Never Colonoscopy.  - CT Abdomen Pelvis (10/22/23) with 3.4 cm low rectal mass at 4.0 cm from anal verge. This is an annular mass along the right anterolateral wall of the rectum (3/136). No bowel obstruction. Innumerable hypodense masses throughout enlarged liver, consistent with extensive hepatic metastatic disease.  - Tumor Markers. CEA significantly elevated 01181 (10/23), CA 19-9 8339.  - IR guided liver biopsy (10/23/23): pathology pending    # Rectal Mass  # Innumerable liver lesions  Reviewed available labs, imaging, and suspected diagnosis with patient.    Overall clinical picture suspicious for metastatic rectal cancer. We reviewed that if this is confirmed by biopsy of a distant site (liver), that this would represent advanced and metastatic disease. Patient states that he would like to avoid chemotherapy if possible. We discussed that if the cancer is confirmed to have spread, his treatement would depend on the pathology of the tumor. If confirmed to be malignancy we discussed that treatment may include medications such as chemotherapy or immunotherapy, but would not be able to determine which until the final pathology had resulted.    Prior to abdominal pain, patient reports being able to perform all of his ADLs. He states that he "was able to walk up and down the full staircase at the [facility]". He lives alone, but has many friends who have been visiting him in the hospital. He lives in [place] and is amenable to following up at the [facility] to discuss next steps.    Plan:  - Please complete remainder of staging work up with colonoscopy  - Patient will need to complete a Chest CT (non contrast)  - Will follow up pathology for tumor mismatch repair status and determination of tumor gene status for ZAK and BRAF mutations and HER2 amplifications on next generation sequencing  - Agree with palliative care consult for symptom management    Dispo:  When medically ready for discharge, please schedule an appointment with Dr. Sherie Khan at the [facility] within 2 weeks. Please ensure that discharge appointment and information is provided in paperwork prior to discharge.     To be discussed with hematology oncology attending Dr. Karime Amaya. Recommendations are considered final after attending attestation.

## 2023-10-23 NOTE — CONSULT NOTE ADULT - PROBLEM SELECTOR RECOMMENDATION 5
.  - GOC as above   - HCP completed naming sister Irma Barbour [phone]  - Full code    In addition to the E/M visit, an advance care planning meeting was performed. Start time:1100 ; End time: 1130; Total time: 30 min. A face to face meeting to discuss advance care planning was held today regarding:    Jeny Julian Primary decision maker:  Patient is able to participate in decision making;  Alternate/surrogate:  Irma Barbour  . Discussed advance directives including, but not limited to, healthcare proxy and code status, as well as disease trajectory, patient's values/goals, and health care options that are available for end of life care. Decision regarding code status: FULL CODE; Documentation completed today: HCP GOC note

## 2023-10-23 NOTE — PROGRESS NOTE ADULT - SUBJECTIVE AND OBJECTIVE BOX
SUBJECTIVE: Patient seen and examined at bedside. Patient states that his abdominal pain is improving since admission, denies any other complaints at this time.      Vital Signs Last 24 Hrs  T(C): 36.5 (23 Oct 2023 11:31), Max: 37.6 (22 Oct 2023 16:47)  T(F): 97.7 (23 Oct 2023 11:31), Max: 99.7 (22 Oct 2023 16:47)  HR: 99 (23 Oct 2023 11:31) (99 - 105)  BP: 127/80 (23 Oct 2023 11:31) (127/80 - 154/83)  BP(mean): 95 (23 Oct 2023 11:31) (95 - 100)  RR: 18 (23 Oct 2023 11:31) (18 - 19)  SpO2: 98% (23 Oct 2023 11:31) (95% - 99%)    Parameters below as of 23 Oct 2023 11:31  Patient On (Oxygen Delivery Method): room air      I&O's Detail      General: NAD, resting comfortably in bed, cachectic   C/V: NSR  Pulm: Nonlabored breathing, no respiratory distress  Abd: soft, ttp RUQ, nondistended, no rebound or guarding   Extrem: WWP, no edema, SCDs in place        LABS:                        10.5   16.41 )-----------( 278      ( 23 Oct 2023 05:30 )             34.0     10-23    132<L>  |  97  |  24<H>  ----------------------------<  74  4.5   |  21<L>  |  1.28    Ca    9.4      23 Oct 2023 05:30  Phos  2.9     10-23  Mg     2.5     10-23    TPro  7.6  /  Alb  2.9<L>  /  TBili  1.7<H>  /  DBili  x   /  AST  209<H>  /  ALT  37  /  AlkPhos  624<H>  10-23    PT/INR - ( 23 Oct 2023 05:30 )   PT: 12.5 sec;   INR: 1.10          PTT - ( 23 Oct 2023 05:30 )  PTT:27.6 sec  Urinalysis Basic - ( 23 Oct 2023 05:30 )    Color: x / Appearance: x / SG: x / pH: x  Gluc: 74 mg/dL / Ketone: x  / Bili: x / Urobili: x   Blood: x / Protein: x / Nitrite: x   Leuk Esterase: x / RBC: x / WBC x   Sq Epi: x / Non Sq Epi: x / Bacteria: x

## 2023-10-23 NOTE — DIETITIAN NUTRITION RISK NOTIFICATION - ADDITIONAL COMMENTS/DIETITIAN RECOMMENDATIONS
1. Recommend Regular diet, add Ensure Enlive 2x/day (350kcal, 20g protein per serving) and MVI   2. Encourage and monitor PO intake  >> Consistently meet >75% of estimated needs during admission   3. Monitor labs, wt trends, GI function, and skin integrity   4. Encourage pt to meet nutritional needs and honor food preferences as able   5. RD to remain available for additional nutrition interventions and diet edu as needed  patient instructed; verbal instruction; increased nutrient needs for repletion, adequate PO intake, supplements between meals

## 2023-10-23 NOTE — DIETITIAN INITIAL EVALUATION ADULT - PERSON TAUGHT/METHOD
increased nutrient needs for repletion, adequate PO intake, supplements between meals/verbal instruction/patient instructed

## 2023-10-23 NOTE — PROGRESS NOTE ADULT - PROBLEM SELECTOR PLAN 1
CT abd and pelvis with rectal mass concerning for malignancy, periportal lymphadenopathy, and multiple liver lesions. Pt following with Dr. Julian and had elevated CEA and CA19 on outpatient labs. Concern for malignancy given lesions in liver.   -Per IR, plan for liver biopsy later in day 10/23   -.2 mg Dilaudid IV push  -Hep panel (A,B,C) all negative   -Per surgery, f/u CT chest and MRI pelvis  -f/u tumor markers (CEA, AFP, CA 19.9, )  - f/u GI consult for colonoscopy   -f/u Palliative consult CT abd and pelvis with rectal mass concerning for malignancy, periportal lymphadenopathy, and multiple liver lesions. Pt following with Dr. Julian and had elevated CEA and CA19 on outpatient labs. Concern for malignancy given lesions in liver. Hep panel negative. Elevated CEA, CA 19.9, , AFP wnl. s/p .2mg IV dilaudid  -IR for biopsy  -Per surgery, f/u CT chest and MRI pelvis  - f/u GI consult for colonoscopy   -f/u Palliative consult  -hemeonc consulted- recommend HIV

## 2023-10-23 NOTE — CONSULT NOTE ADULT - ASSESSMENT
63M with no PMH (had not seen a doctor), presenting with 4 weeks of abdominal pain. Saw Dr. Julian out-patient for CT abdomen/pelvis, which showed liver masses and possible rectal mass. CEA elevated to 9094 and CA 19-9 elevated to 5930.    CT Abdomen and Pelvis w/ Oral Cont and w/ IV Cont:   1. Innumerable masses in enlarged liver, consistent with extensive   hepatic metastatic disease  2. There is a low rectal mass that is suspicious for rectal cancer.   Recommend further evaluation with colonoscopy.  3. Small to moderate ascites.  4. Small right pleural effusion.  5. Moderate periportal lymphadenopathy    Recommendations:   - plan for colonoscopy tomorrow  - NPO except meds after midnight tonight  - start prep with 4L Golytely at 4PM   - clear liquid diet today     Case discussed with Dr. Dong. GI Team will continue to follow.    Tali Alatorre D.O.   Gastroenterology Fellow  Weekday 7am-5pm Pager: [phone]  Weeknights/Weekend/Holiday Coverage: Please call the  for contact info

## 2023-10-23 NOTE — DIETITIAN INITIAL EVALUATION ADULT - NS FNS DIET ORDER
Diet, Regular (10-22-23 @ 19:33)  Diet, NPO after Midnight:      NPO Start Date: 22-Oct-2023,   NPO Start Time: 23:59 (10-22-23 @ 19:33)

## 2023-10-23 NOTE — CONSULT NOTE ADULT - ASSESSMENT
62 yo M with no pmh who pw abdominal pain and unintentional weight loss for 4 weeks.  CT ab/pelv on arrival revealed low rectal mass and hepatomegaly w/ innumerable masses c/w extensive mets. Palliative consulted for complex medical decision making / symptom management in the setting of advanced illness.      - On discharge, patient should follow-up outpatient with palliative care provider, Dr. Kumari at  [address]  - Please email [email or web address] with patient's name, MRN and Dr. Kumari's name to request an outpatient appointment and call #[phone] to schedule appointment

## 2023-10-23 NOTE — PROGRESS NOTE ADULT - ASSESSMENT
64 yo M w/ no significant PMHx who recently saw Dr. Julian in clinic for initial evaluation of abdominal bulge and significant unintentional weight loss. On exam, afebrile, hemodynamically stable, abdomen soft in lower quadrants but firm in upper abdomen with concern for abdominal mass, no hernia appreciated, non tender, nondistended, no rebound or guarding. Labs notable for leukocytosis with L shift, transaminitis, elevated ALP and hyperbilrubinemia. CT A/P with PO & IV contrast notable for low rectal mass s/f rectal ca, hepatomegaly w/ innumerable masses c/w extensive mets,     Plan/Recs:   - Medical oncology consult  - f/u CT chest   - GI consult for colonoscopy   - IR consult for biopsy   - f/u MRI  - Colorectal surgery (team 5c) following

## 2023-10-23 NOTE — CONSULT NOTE ADULT - SUBJECTIVE AND OBJECTIVE BOX
HPI:  64 yo M with no PMH (pt has not seen a doctor in 30 years since moving to [place]) presents with 4 weeks of abdominal pain, progressively worsened over the last few days. Pt reports the abd pain started around his umbilicus and has since spread to the right side of his abd. Pt saw Dr. Julian outpatient on 10/12 as he was concerned mass was a hernia. Dr. Julian recommended CT abd/pelv and ordered CEA (which was elevated to 9094) and CA 19 (which was elevated to 5930). Pt reports over the last 4 weeks he has been experiencing early satiety, dry  mouth, and bitter taste. He also reports 2 weeks experiencing gastritis like symptoms such as nausea and vomiting. He reports less frequent stools but denies any blood, foul odor, or discoloration. Pt denies any tobacco, alcohol, or IVDU use.    (22 Oct 2023 17:06)    Hospital course, primary team, and consultant notes reviewed. Pt found sitting up in bed, NAD reading the paper. Describes progressive RUQ abdominal pain that has become worse in the weeks/days prior to admission. Fatigue, but is able to continue working as . Further description as below. Comprehensive ROS as noted below, collateral obtained from chart/team/family. Exploration of GOC documented as below.    PAST MEDICAL & SURGICAL HISTORY:  No pertinent past medical history    FAMILY HISTORY:   Reviewed; no history of     in mother or father    Opiate Naive (Y/N):  Yes  iStop reviewed (Y/N): Yes.   No Rx found on iStop review (Ref#:       [id number]    Items that are not checked are not present  PSYCHOSOCIAL ASSESSMENT:  - Significant other/partner: [  ]  Children: [  ]  - Living Situation: Home [x  ] Long term care [  ]  Rehab[  ]  Other[  ]  - Support system: strong[  ] adequate[ x ] inadequate[  ]  - Mandaeism/Spiritual practice: spiritual   - Role of organized Hoahaoism important [  ] some [ x ] unable to assess dt pt mentation [  ]  - Coping: well[x  ]  with difficulty[  ]  poor coping[  ]  unable to assess dt pt mentation [  ]  -    - Rest of family in [facility]     ADVANCE DIRECTIVES:    - MOLST[  ]     - Living Will [  ]     DECISION MAKER(s):  - Health Care Proxy(s) [  ]  Surrogate(s)[  ] Guardian[  ]           Name(s)/Phone Number(s):     BASELINE (I)ADLs (prior to admission):  - Sanders:  Total[x  ] Moderate[  ] Dependent[  ]    Allergies    No Known Allergies    Intolerances        Medications:      MEDICATIONS  (STANDING):  influenza   Vaccine 0.5 milliLiter(s) IntraMuscular once    MEDICATIONS  (PRN):  acetaminophen     Tablet .. 650 milliGRAM(s) Oral every 6 hours PRN Temp greater or equal to 38C (100.4F), Mild Pain (1 - 3)  aluminum hydroxide/magnesium hydroxide/simethicone Suspension 30 milliLiter(s) Oral every 4 hours PRN Dyspepsia  melatonin 3 milliGRAM(s) Oral at bedtime PRN Insomnia  ondansetron Injectable 4 milliGRAM(s) IV Push every 8 hours PRN Nausea and/or Vomiting      PRESENT SYMPTOMS:   [ ]Unable to obtain due to poor mentation   Source if other than patient:  [ ]Family   [ ]Team     Pain [ x ]  Some interference with ADLs   Location :      RUQ  Quality:  deep ache, pushing   Radiation: diffuse Right side   Timing: constant with BTP  Aggravating factors: lying on R side, deep palpation   Minimal acceptable level (0-10 scale): 2/10  Severity in last 24h (0-10 scale) : 9/10  Current score (0-10 scale): 2/10  Improves with:  sp MS 4 mg IVP x1 No unexpected adverse effects of opiates noted: no sedation/dizziness/nausea/myoclonus.    PAIN AD Score:  0  [email or web address] (press ctrl +  left click to view)    Analgesic Use (PRNs) for past 24 hours:  - MS 4 mg IVP x1       If [  ], pt denies symptom.   Dyspnea:         [  ]  Anxiety:           [  ]  Difficulty sleeping: [  ]  Fatigue:           [ x ]  Nausea:           [  x] intermittent prior to admission, has been without over last 24hs   Loss of appetite:     [  ] good appetite, severe early satiety   Dysphagia: [  ]  Constipation:   [  ]        LBM 10/23  Grief Present   [  ] Yes   [ x ] No   Other Symptoms:    All other review of systems negative [ x ]    ECOG Performance:     2  Current Palliative Performance Scale/Karnofsky Score:   50 %  Preadmit Karnofsky:  60/70 %          PEx:  General: thin man sitting up in bed, NAD  alert  oriented x  3   verbal   Behavioral: Calm pleasant   HEENT: atraumatic,  + temporal wasting,  No dry mouth  RESP: Reg rhythm, No  tachypnea/labored breathing,  No audible excessive secretions,  CTAB, diminished bilat bases  CV: RRR, S1S2,  +tachycardia  GI: flat, soft lower quadrants, semi firm RUQ, mild tenderness to palpation, hypoactive bs   MUSK: weakness x4,No  edema, ambulatory   SKIN: No abnormal skin lesions, Poor skin turgor, mild jaundice   NEURO:  No deficits, cognitive impairment, encephalopathic dsyphagia dysarthria paresis  Oral intake ability:  full capability    T(C): 36.5 (10-23-23 @ 11:31), Max: 37.6 (10-22-23 @ 16:47)  HR: 99 (10-23-23 @ 11:31) (99 - 105)  BP: 127/80 (10-23-23 @ 11:31) (127/80 - 154/83)  RR: 18 (10-23-23 @ 11:31) (18 - 19)  SpO2: 98% (10-23-23 @ 11:31) (95% - 99%)  Wt(kg): --    Labs:    CBC:                        10.5   16.41 )-----------( 278      ( 23 Oct 2023 05:30 )             34.0     CMP:    10-23    132<L>  |  97  |  24<H>  ----------------------------<  74  4.5   |  21<L>  |  1.28    Ca    9.4      23 Oct 2023 05:30  Phos  2.9     10-23  Mg     2.5     10-23    TPro  7.6  /  Alb  2.9<L>  /  TBili  1.7<H>  /  DBili  x   /  AST  209<H>  /  ALT  37  /  AlkPhos  624<H>  10-23    PT/INR - ( 23 Oct 2023 05:30 )   PT: 12.5 sec;   INR: 1.10          PTT - ( 23 Oct 2023 05:30 )  PTT:27.6 sec   Urinalysis Basic - ( 23 Oct 2023 05:30 )    Color: x / Appearance: x / SG: x / pH: x  Gluc: 74 mg/dL / Ketone: x  / Bili: x / Urobili: x   Blood: x / Protein: x / Nitrite: x   Leuk Esterase: x / RBC: x / WBC x   Sq Epi: x / Non Sq Epi: x / Bacteria: x        RADIOLOGY & ADDITIONAL STUDIES:  Imaging:  Reviewed  CT Abdomen and Pelvis w/ Oral Cont and w/ IV Cont:   1. Innumerable masses in enlarged liver, consistent with extensive   hepatic metastatic disease  2. There is a low rectal mass that is suspicious for rectal cancer.   Recommend further evaluation with colonoscopy.  3. Small to moderate ascites.  4. Small right pleural effusion.  5. Moderate periportal lymphadenopathy    GOC discussion:

## 2023-10-23 NOTE — CONSULT NOTE ADULT - PROBLEM SELECTOR RECOMMENDATION 3
.  pps 50%, requires assistance with some ADLs. lives alone with limited social support to assist with skilled nursing care.   - cw supportive care, skin care, encourage OOB  - PT cs  - Anticipate pt will need HHA support in future if pt choses to pursue DMTx here in [place]

## 2023-10-23 NOTE — PROGRESS NOTE ADULT - ASSESSMENT
Pt is 64 y/o male with no pmh presented to ED w/ abdominal pain for 4 weeks. Pt has lost 20 lbs in past 8 weeks and has complained of "pencil like" stools. CT ab/pelv on arrival revealed low rectal mass and hepatomegaly w/ innumerable masses c/w extensive mets.   Pt is 62 y/o male with no pmh presented to ED w/ abdominal pain for 4 weeks. Pt has lost 20 lbs in past 8 weeks and has complained of "pencil like" stools. CT ab/pelv on arrival revealed low rectal mass and hepatomegaly w/ innumerable masses c/w extensive mets.

## 2023-10-23 NOTE — CONSULT NOTE ADULT - PROBLEM SELECTOR RECOMMENDATION 6
.  Complex medical decision making / symptom management in the setting of advanced illness.    Coping:  well[ x ] with difficulty[  ] poor coping[  ] unable to assess[  ]   Support system: strong[  ] adequate[ x ] inadequate[ x ]    Active Psychosocial Referrals:  SW/CM[ x ] PT/OT[ x ] Hospice[  ]  Ethics[  ]  GIUSEPPE [facility] Patient/Family Support[  ] Chaplaincy[   ]  Massage Therapy[ x ] Music Therapy [x  ] Holistic RN[  ]    Active Outpatient Palliative Care Referrals:  Supportive Oncology Clinic [x ]    - For new or uncontrolled symptoms, please call Palliative Care at [phone]. The service is available 24/7 (including nights & weekends) to provide symptom management recommendations over the phone as appropriate  - Will continue to follow for ongoing GOC discussion / titration of palliative regimen. Emotional support provided, questions answered.

## 2023-10-23 NOTE — DIETITIAN INITIAL EVALUATION ADULT - PROBLEM SELECTOR PLAN 3
Pt with elevated alk phos 746 (elevated from 10/12 bloodwork) and , ALT 41. CT abd with liver lesions and small to moderate ascites. likely secondary to metastatic lesions vs infectious process. Pt denies getting hepatits B vaccine or IVDU  - IR consult for liver biopsy  - f/u complete hepatitis panel  - f/u coags

## 2023-10-23 NOTE — CONSULT NOTE ADULT - ASSESSMENT
Assessment: 64 yo M with no PMH (pt has not seen a doctor in 30 years since moving to [place]) presents with 4 weeks of abdominal pain, progressively worsened over the last few days. Pt reports the abd pain started around his umbilicus and has since spread to the right side of his abd. Pt saw Dr. Julian outpatient on 10/12 as he was concerned mass was a hernia. Dr. Julian recommended CT abd/pelv and ordered CEA (which was elevated to 9094) and CA 19 (which was elevated to 5930). Ct showing large rectal mass and multiple liver mets. IR consulted for biopsy of liver mass. Case reviewed with Dr. Hartley, plan for liver mass biopsy with sedation pending AM labs.    Recommendations: Maintain NPO x 8 hours prior to procedure. D/C blood thinners.     Communicated with: Dr. Myers primary team

## 2023-10-23 NOTE — DIETITIAN INITIAL EVALUATION ADULT - PROBLEM SELECTOR PLAN 1
Pt meets 2/4 SIRS (Tachy 108 and WBC 16.97 with neutrophilic predominance). Unclear source. CT abd/pelvis with small pleural effusion and moderate size ascites. UA with few bacteria. low suspicion for UTI, pneumonia. Possible SBP given small to moderate ascites seen on CT however less likely given pt afebrile and NT on exam vs gastritis given abd and loss of appetite. No recent travel. Likely reactive in setting of malignancy. s/p 1L NS bolus  - monitor off abx

## 2023-10-23 NOTE — DIETITIAN INITIAL EVALUATION ADULT - OTHER INFO
64 yo M with no PMH (pt has not seen a doctor in 30 years since moving to [place]) presents with 4 weeks of abdominal pain, progressively worsened over the last few days. Pt reports the abd pain started around his umbilicus and has since spread to the right side of his abd. Pt saw Dr. Julian outpatient on 10/12 as he was concerned mass was a hernia. Dr. Julian recommended CT abd/pelv and ordered CEA (which was elevated to 9094) and CA 19 (which was elevated to 5930). Pt reports over the last 4 weeks he has been experiencing early satiety, dry  mouth, and bitter taste. He also reports 2 weeks experiencing gastritis like symptoms such as nausea and vomiting. He reports less frequent stools but denies any blood, foul odor, or discoloration. Pt denies any tobacco, alcohol, or IVDU use.     Patient seen at bedside for MST assessment. Currently NPO pending liver biopsy. Confirms NKFA. No difficulty chewing/swallowing reported. Reports good appetite PTA, however endorses ongoing abdominal pain and early satiety x ~4 weeks. Patient states he has only been drinking vegetable juice x 3 weeks. Dosing weight: 110#, BMI 15.8 (underweight). Reports UBW of 134# and endorses weight loss over 6 weeks. -24#/18% x 6 weeks, clinically significant. No pressure injuries or edema documented. Denies N/V/D/C/abd pain this AM, last BM 10/23 per EMR. Labs: Na 132, LFTs elevated, glucose trending 74-93 x 24 hours. Meds: zofran. NFPE notable for moderate to severe muscle/fat wasting to temples, orbitals, buccals, clavicles, and shoulders. Based on ASPEN guidelines, patient meets criteria for severe malnutrition. Encouraged adequate PO intake as able and reviewed food preferences: vegetarian, eggs and dairy are ok, no fish. Patient amenable to add Ensure Enlive 2x/day, discussed drinking supplement between meals to optimize PO intake of food at meal times, patient expressed understanding. See full nutrition recommendations below.

## 2023-10-23 NOTE — DIETITIAN INITIAL EVALUATION ADULT - ADD RECOMMEND
1. Recommend Regular diet, add Ensure Enlive 2x/day (350kcal, 20g protein per serving) and MVI   2. Encourage and monitor PO intake  >> Consistently meet >75% of estimated needs during admission   3. Monitor labs, wt trends, GI function, and skin integrity   4. Encourage pt to meet nutritional needs and honor food preferences as able   5. RD to remain available for additional nutrition interventions and diet edu as needed

## 2023-10-23 NOTE — DIETITIAN INITIAL EVALUATION ADULT - PROBLEM SELECTOR PLAN 6
D: regular  E: replete Mg <2, Phosp <3, K <4  F: 1L NS bolus  DVT ppx: hold for possible IR procedure  Code: Full

## 2023-10-23 NOTE — PROGRESS NOTE ADULT - PROBLEM SELECTOR PLAN 5
Cr 1.37-->1.28, (Labs from 10/12 1.09). UA with casts. possibly in setting of poor PO intake. s/p 1L NS  -bladder scan: retaining 148 cc  - continue to trend Cr Cr 1.37-->1.28, (Labs from 10/12 1.09). UA with casts. possibly in setting of poor PO intake. s/p 1L NS Cr 1.28 on 10/23  -bladder scan: retaining 148 cc

## 2023-10-23 NOTE — CONSULT NOTE ADULT - SUBJECTIVE AND OBJECTIVE BOX
Initial GI Consul Note:       GASTROENTEROLOGY CONSULT NOTE    HPI:  62 yo M with no PMH (pt has not seen a doctor in 30 years since moving to [place]) presents with 4 weeks of abdominal pain, progressively worsened over the last few days. Pt reports the abd pain started around his umbilicus and has since spread to the right side of his abd. Pt saw Dr. Julian outpatient on 10/12 as he was concerned mass was a hernia. Dr. Julian recommended CT abd/pelv and ordered CEA (which was elevated to 9094) and CA 19 (which was elevated to 5930). Pt reports over the last 4 weeks he has been experiencing early satiety, dry  mouth, and bitter taste. He also reports 2 weeks experiencing gastritis like symptoms such as nausea and vomiting. He reports less frequent stools but denies any blood, foul odor, or discoloration. Pt denies any tobacco, alcohol, or IVDU use.       In the ED:    - VS: Tmax: 98.1 , HR: 108 , BP: 147/84 , RR: 18  , O2:  98  % on RA     - Pertinent Labs: WBC 16.97 (neutrophil predominance), Hgb 11.7, Na 133, Cr 1.37, Alk Phosp 746, , ALT 41, UA with casts    - Imaging:   CT Abdomen and Pelvis w/ Oral Cont and w/ IV Cont:   1. Innumerable masses in enlarged liver, consistent with extensive   hepatic metastatic disease  2. There is a low rectal mass that is suspicious for rectal cancer.   Recommend further evaluation with colonoscopy.  3. Small to moderate ascites.  4. Small right pleural effusion.  5. Moderate periportal lymphadenopathy      - Treatment/interventions: 1L IV bolus, morphine 4mg IV x1 surg consulted         (22 Oct 2023 17:06)    Allergies    No Known Allergies    Intolerances      Home Medications:    MEDICATIONS:  MEDICATIONS  (STANDING):  influenza   Vaccine 0.5 milliLiter(s) IntraMuscular once    MEDICATIONS  (PRN):  acetaminophen     Tablet .. 650 milliGRAM(s) Oral every 6 hours PRN Temp greater or equal to 38C (100.4F), Mild Pain (1 - 3)  aluminum hydroxide/magnesium hydroxide/simethicone Suspension 30 milliLiter(s) Oral every 4 hours PRN Dyspepsia  melatonin 3 milliGRAM(s) Oral at bedtime PRN Insomnia  morphine  - Injectable 4 milliGRAM(s) IV Push every 4 hours PRN Severe Pain (7 - 10)  morphine  IR 15 milliGRAM(s) Oral every 4 hours PRN Moderate Pain (4 - 6)  ondansetron Injectable 4 milliGRAM(s) IV Push every 8 hours PRN Nausea and/or Vomiting    PAST MEDICAL & SURGICAL HISTORY:  No pertinent past medical history        FAMILY HISTORY:    SOCIAL HISTORY:  [place]: [ ] Current, [ ] Former, [ ] Never; Pack Years:  Alcohol:  Illicit Drugs:    REVIEW OF SYSTEMS:  CONSTITUTIONAL: No weakness, fevers or chills  HEENT: No visual changes; No vertigo or throat pain   NECK: No pain or stiffness  RESPIRATORY: No cough, wheezing, hemoptysis; No shortness of breath  CARDIOVASCULAR: No chest pain or palpitations  GASTROINTESTINAL: No abdominal or epigastric pain. No nausea, vomiting, or hematemesis; No diarrhea or constipation. No melena or hematochezia.  GENITOURINARY: No dysuria, frequency or hematuria  NEUROLOGICAL: No numbness or weakness  SKIN: No itching, burning, rashes, or lesions   All other 10 review of systems is negative unless indicated above.    Vital Signs Last 24 Hrs  T(C): 36.5 (23 Oct 2023 11:31), Max: 37.5 (23 Oct 2023 05:28)  T(F): 97.7 (23 Oct 2023 11:31), Max: 99.5 (23 Oct 2023 05:28)  HR: 99 (23 Oct 2023 11:31) (99 - 105)  BP: 127/80 (23 Oct 2023 11:31) (127/80 - 141/76)  BP(mean): 95 (23 Oct 2023 11:31) (95 - 100)  RR: 18 (23 Oct 2023 11:31) (18 - 19)  SpO2: 98% (23 Oct 2023 11:31) (95% - 99%)    Parameters below as of 23 Oct 2023 11:31  Patient On (Oxygen Delivery Method): room air          PHYSICAL EXAM:    General: Well developed; well nourished; in no acute distress  Eyes: Anicteric sclerae, moist conjunctivae  HENT: Moist mucous membranes  Neck: Trachea midline, supple  Lungs: Normal respiratory effort, no intercostal retractions  Cardiovascular: RRR  Abdomen: Soft, non-tender non-distended; Normal bowel sounds; No rebound or guarding  Extremities: Normal range of motion, No clubbing, cyanosis or edema  Neurological: Alert and oriented x3  Skin: Warm and dry. No obvious rash    LABS:                        10.5   16.41 )-----------( 278      ( 23 Oct 2023 05:30 )             34.0     10-23    132<L>  |  97  |  24<H>  ----------------------------<  74  4.5   |  21<L>  |  1.28    Ca    9.4      23 Oct 2023 05:30  Phos  2.9     10-23  Mg     2.5     10-23    TPro  7.6  /  Alb  2.9<L>  /  TBili  1.7<H>  /  DBili  x   /  AST  209<H>  /  ALT  37  /  AlkPhos  624<H>  10-23        PT/INR - ( 23 Oct 2023 05:30 )   PT: 12.5 sec;   INR: 1.10          PTT - ( 23 Oct 2023 05:30 )  PTT:27.6 sec    Urinalysis with Rflx Culture (collected 22 Oct 2023 12:05)      RADIOLOGY & ADDITIONAL STUDIES:     Reviewed     Initial GI Consul Note:     HPI:  63M with no PMH (pt has not seen a doctor in 30 years since moving to [place]) presents with 4 weeks of abdominal pain, progressively worsened over the last few days. Pt reports the abd pain started around his umbilicus and has since spread to the right side of his abd. Pt saw Dr. Julian outpatient on 10/12 as he was concerned mass was a hernia. Dr. Julian recommended CT abd/pelv and ordered CEA (which was elevated to 9094) and CA 19 (which was elevated to 5930). Pt reports over the last 4 weeks he has been experiencing early satiety, dry  mouth, and bitter taste. He also reports 2 weeks experiencing gastritis like symptoms such as nausea and vomiting. He reports less frequent stools but denies any blood, foul odor, or discoloration. Pt denies any tobacco, alcohol, or IVDU use.     In the ED:    - VS: Tmax: 98.1 , HR: 108 , BP: 147/84 , RR: 18  , O2:  98  % on RA     - Pertinent Labs: WBC 16.97 (neutrophil predominance), Hgb 11.7, Na 133, Cr 1.37, Alk Phosp 746, , ALT 41, UA with casts    CT Abdomen and Pelvis w/ Oral Cont and w/ IV Cont:   1. Innumerable masses in enlarged liver, consistent with extensive   hepatic metastatic disease  2. There is a low rectal mass that is suspicious for rectal cancer.   Recommend further evaluation with colonoscopy.  3. Small to moderate ascites.  4. Small right pleural effusion.  5. Moderate periportal lymphadenopathy      Allergies    No Known Allergies    Intolerances      Home Medications:    MEDICATIONS:  MEDICATIONS  (STANDING):  influenza   Vaccine 0.5 milliLiter(s) IntraMuscular once    MEDICATIONS  (PRN):  acetaminophen     Tablet .. 650 milliGRAM(s) Oral every 6 hours PRN Temp greater or equal to 38C (100.4F), Mild Pain (1 - 3)  aluminum hydroxide/magnesium hydroxide/simethicone Suspension 30 milliLiter(s) Oral every 4 hours PRN Dyspepsia  melatonin 3 milliGRAM(s) Oral at bedtime PRN Insomnia  morphine  - Injectable 4 milliGRAM(s) IV Push every 4 hours PRN Severe Pain (7 - 10)  morphine  IR 15 milliGRAM(s) Oral every 4 hours PRN Moderate Pain (4 - 6)  ondansetron Injectable 4 milliGRAM(s) IV Push every 8 hours PRN Nausea and/or Vomiting    PAST MEDICAL & SURGICAL HISTORY:  No pertinent past medical history        FAMILY HISTORY:    SOCIAL HISTORY:  [place]: [ ] Current, [ ] Former, [ ] Never; Pack Years:  Alcohol:  Illicit Drugs:    REVIEW OF SYSTEMS:  CONSTITUTIONAL: No weakness, fevers or chills  HEENT: No visual changes; No vertigo or throat pain   NECK: No pain or stiffness  RESPIRATORY: No cough, wheezing, hemoptysis; No shortness of breath  CARDIOVASCULAR: No chest pain or palpitations  GASTROINTESTINAL: No abdominal or epigastric pain. No nausea, vomiting, or hematemesis; No diarrhea or constipation. No melena or hematochezia.  GENITOURINARY: No dysuria, frequency or hematuria  NEUROLOGICAL: No numbness or weakness  SKIN: No itching, burning, rashes, or lesions   All other 10 review of systems is negative unless indicated above.    Vital Signs Last 24 Hrs  T(C): 36.5 (23 Oct 2023 11:31), Max: 37.5 (23 Oct 2023 05:28)  T(F): 97.7 (23 Oct 2023 11:31), Max: 99.5 (23 Oct 2023 05:28)  HR: 99 (23 Oct 2023 11:31) (99 - 105)  BP: 127/80 (23 Oct 2023 11:31) (127/80 - 141/76)  BP(mean): 95 (23 Oct 2023 11:31) (95 - 100)  RR: 18 (23 Oct 2023 11:31) (18 - 19)  SpO2: 98% (23 Oct 2023 11:31) (95% - 99%)    Parameters below as of 23 Oct 2023 11:31  Patient On (Oxygen Delivery Method): room air        PHYSICAL EXAM:  General: No acute distress, thin frail cachectic   Lungs: Normal respiratory effort and no intercostal retractions  Cardiovascular: RRR  Abdomen: Soft, hard palpable mass in abdomen   Neurological: Alert and oriented x3  Skin: Warm and dry. No obvious rash      LABS:                        10.5   16.41 )-----------( 278      ( 23 Oct 2023 05:30 )             34.0     10-23    132<L>  |  97  |  24<H>  ----------------------------<  74  4.5   |  21<L>  |  1.28    Ca    9.4      23 Oct 2023 05:30  Phos  2.9     10-23  Mg     2.5     10-23    TPro  7.6  /  Alb  2.9<L>  /  TBili  1.7<H>  /  DBili  x   /  AST  209<H>  /  ALT  37  /  AlkPhos  624<H>  10-23        PT/INR - ( 23 Oct 2023 05:30 )   PT: 12.5 sec;   INR: 1.10          PTT - ( 23 Oct 2023 05:30 )  PTT:27.6 sec    Urinalysis with Rflx Culture (collected 22 Oct 2023 12:05)      RADIOLOGY & ADDITIONAL STUDIES:     Reviewed

## 2023-10-23 NOTE — DIETITIAN INITIAL EVALUATION ADULT - PERTINENT LABORATORY DATA
10-23    132<L>  |  97  |  24<H>  ----------------------------<  74  4.5   |  21<L>  |  1.28    Ca    9.4      23 Oct 2023 05:30  Phos  2.9     10-23  Mg     2.5     10-23    TPro  7.6  /  Alb  2.9<L>  /  TBili  1.7<H>  /  DBili  x   /  AST  209<H>  /  ALT  37  /  AlkPhos  624<H>  10-23

## 2023-10-23 NOTE — CONSULT NOTE ADULT - CONSULT REASON
Rectal mass with possible hepatic mets
suspected metastatic rectal cancer
request for liver mass biopsy
Liver metastasis
Palliative consulted for complex medical decision making / symptom management / ACP in the setting of advanced illness.

## 2023-10-23 NOTE — DIETITIAN INITIAL EVALUATION ADULT - PERTINENT MEDS FT
MEDICATIONS  (STANDING):  influenza   Vaccine 0.5 milliLiter(s) IntraMuscular once    MEDICATIONS  (PRN):  acetaminophen     Tablet .. 650 milliGRAM(s) Oral every 6 hours PRN Temp greater or equal to 38C (100.4F), Mild Pain (1 - 3)  aluminum hydroxide/magnesium hydroxide/simethicone Suspension 30 milliLiter(s) Oral every 4 hours PRN Dyspepsia  melatonin 3 milliGRAM(s) Oral at bedtime PRN Insomnia  ondansetron Injectable 4 milliGRAM(s) IV Push every 8 hours PRN Nausea and/or Vomiting

## 2023-10-23 NOTE — PROGRESS NOTE ADULT - PROBLEM SELECTOR PLAN 4
, WBC 16.4, afebrile, no cough, no likely source of infection. UA few bacteria.  -trend wbc , WBC 16.4, afebrile, no cough, no likely source of infection. UA few bacteria. Likely reactive to malignancy   -trend wbc

## 2023-10-24 ENCOUNTER — RESULT REVIEW (OUTPATIENT)
Age: 63
End: 2023-10-24

## 2023-10-24 ENCOUNTER — TRANSCRIPTION ENCOUNTER (OUTPATIENT)
Age: 63
End: 2023-10-24

## 2023-10-24 PROBLEM — Z78.9 OTHER SPECIFIED HEALTH STATUS: Chronic | Status: ACTIVE | Noted: 2023-10-22

## 2023-10-24 LAB
ALBUMIN SERPL ELPH-MCNC: 2.6 G/DL — LOW (ref 3.3–5)
ALBUMIN SERPL ELPH-MCNC: 2.6 G/DL — LOW (ref 3.3–5)
ALP SERPL-CCNC: 575 U/L — HIGH (ref 40–120)
ALP SERPL-CCNC: 575 U/L — HIGH (ref 40–120)
ALT FLD-CCNC: 38 U/L — SIGNIFICANT CHANGE UP (ref 10–45)
ALT FLD-CCNC: 38 U/L — SIGNIFICANT CHANGE UP (ref 10–45)
ANION GAP SERPL CALC-SCNC: 15 MMOL/L — SIGNIFICANT CHANGE UP (ref 5–17)
ANION GAP SERPL CALC-SCNC: 15 MMOL/L — SIGNIFICANT CHANGE UP (ref 5–17)
ANION GAP SERPL CALC-SCNC: 18 MMOL/L — HIGH (ref 5–17)
ANION GAP SERPL CALC-SCNC: 18 MMOL/L — HIGH (ref 5–17)
AST SERPL-CCNC: 240 U/L — HIGH (ref 10–40)
AST SERPL-CCNC: 240 U/L — HIGH (ref 10–40)
B-OH-BUTYR SERPL-SCNC: 0.5 MMOL/L — HIGH
B-OH-BUTYR SERPL-SCNC: 0.5 MMOL/L — HIGH
BASOPHILS # BLD AUTO: 0.05 K/UL — SIGNIFICANT CHANGE UP (ref 0–0.2)
BASOPHILS # BLD AUTO: 0.05 K/UL — SIGNIFICANT CHANGE UP (ref 0–0.2)
BASOPHILS NFR BLD AUTO: 0.3 % — SIGNIFICANT CHANGE UP (ref 0–2)
BASOPHILS NFR BLD AUTO: 0.3 % — SIGNIFICANT CHANGE UP (ref 0–2)
BILIRUB SERPL-MCNC: 1.7 MG/DL — HIGH (ref 0.2–1.2)
BILIRUB SERPL-MCNC: 1.7 MG/DL — HIGH (ref 0.2–1.2)
BUN SERPL-MCNC: 25 MG/DL — HIGH (ref 7–23)
BUN SERPL-MCNC: 25 MG/DL — HIGH (ref 7–23)
BUN SERPL-MCNC: 29 MG/DL — HIGH (ref 7–23)
BUN SERPL-MCNC: 29 MG/DL — HIGH (ref 7–23)
CALCIUM SERPL-MCNC: 8.8 MG/DL — SIGNIFICANT CHANGE UP (ref 8.4–10.5)
CALCIUM SERPL-MCNC: 8.8 MG/DL — SIGNIFICANT CHANGE UP (ref 8.4–10.5)
CALCIUM SERPL-MCNC: 9.1 MG/DL — SIGNIFICANT CHANGE UP (ref 8.4–10.5)
CALCIUM SERPL-MCNC: 9.1 MG/DL — SIGNIFICANT CHANGE UP (ref 8.4–10.5)
CHLORIDE SERPL-SCNC: 97 MMOL/L — SIGNIFICANT CHANGE UP (ref 96–108)
CHLORIDE SERPL-SCNC: 97 MMOL/L — SIGNIFICANT CHANGE UP (ref 96–108)
CHLORIDE SERPL-SCNC: 99 MMOL/L — SIGNIFICANT CHANGE UP (ref 96–108)
CHLORIDE SERPL-SCNC: 99 MMOL/L — SIGNIFICANT CHANGE UP (ref 96–108)
CO2 SERPL-SCNC: 19 MMOL/L — LOW (ref 22–31)
CO2 SERPL-SCNC: 19 MMOL/L — LOW (ref 22–31)
CO2 SERPL-SCNC: 21 MMOL/L — LOW (ref 22–31)
CO2 SERPL-SCNC: 21 MMOL/L — LOW (ref 22–31)
CREAT SERPL-MCNC: 1.39 MG/DL — HIGH (ref 0.5–1.3)
CREAT SERPL-MCNC: 1.39 MG/DL — HIGH (ref 0.5–1.3)
CREAT SERPL-MCNC: 1.41 MG/DL — HIGH (ref 0.5–1.3)
CREAT SERPL-MCNC: 1.41 MG/DL — HIGH (ref 0.5–1.3)
EGFR: 57 ML/MIN/1.73M2 — LOW
EGFR: 57 ML/MIN/1.73M2 — LOW
EGFR: 58 ML/MIN/1.73M2 — LOW
EGFR: 58 ML/MIN/1.73M2 — LOW
EOSINOPHIL # BLD AUTO: 0.09 K/UL — SIGNIFICANT CHANGE UP (ref 0–0.5)
EOSINOPHIL # BLD AUTO: 0.09 K/UL — SIGNIFICANT CHANGE UP (ref 0–0.5)
EOSINOPHIL NFR BLD AUTO: 0.6 % — SIGNIFICANT CHANGE UP (ref 0–6)
EOSINOPHIL NFR BLD AUTO: 0.6 % — SIGNIFICANT CHANGE UP (ref 0–6)
GLUCOSE SERPL-MCNC: 73 MG/DL — SIGNIFICANT CHANGE UP (ref 70–99)
GLUCOSE SERPL-MCNC: 73 MG/DL — SIGNIFICANT CHANGE UP (ref 70–99)
GLUCOSE SERPL-MCNC: 89 MG/DL — SIGNIFICANT CHANGE UP (ref 70–99)
GLUCOSE SERPL-MCNC: 89 MG/DL — SIGNIFICANT CHANGE UP (ref 70–99)
HCT VFR BLD CALC: 31.9 % — LOW (ref 39–50)
HCT VFR BLD CALC: 31.9 % — LOW (ref 39–50)
HGB BLD-MCNC: 9.8 G/DL — LOW (ref 13–17)
HGB BLD-MCNC: 9.8 G/DL — LOW (ref 13–17)
HIV 1+2 AB+HIV1 P24 AG SERPL QL IA: SIGNIFICANT CHANGE UP
HIV 1+2 AB+HIV1 P24 AG SERPL QL IA: SIGNIFICANT CHANGE UP
IMM GRANULOCYTES NFR BLD AUTO: 0.8 % — SIGNIFICANT CHANGE UP (ref 0–0.9)
IMM GRANULOCYTES NFR BLD AUTO: 0.8 % — SIGNIFICANT CHANGE UP (ref 0–0.9)
LACTATE SERPL-SCNC: 2.5 MMOL/L — HIGH (ref 0.5–2)
LACTATE SERPL-SCNC: 2.5 MMOL/L — HIGH (ref 0.5–2)
LYMPHOCYTES # BLD AUTO: 1.06 K/UL — SIGNIFICANT CHANGE UP (ref 1–3.3)
LYMPHOCYTES # BLD AUTO: 1.06 K/UL — SIGNIFICANT CHANGE UP (ref 1–3.3)
LYMPHOCYTES # BLD AUTO: 7.4 % — LOW (ref 13–44)
LYMPHOCYTES # BLD AUTO: 7.4 % — LOW (ref 13–44)
MAGNESIUM SERPL-MCNC: 2.4 MG/DL — SIGNIFICANT CHANGE UP (ref 1.6–2.6)
MAGNESIUM SERPL-MCNC: 2.4 MG/DL — SIGNIFICANT CHANGE UP (ref 1.6–2.6)
MCHC RBC-ENTMCNC: 24.6 PG — LOW (ref 27–34)
MCHC RBC-ENTMCNC: 24.6 PG — LOW (ref 27–34)
MCHC RBC-ENTMCNC: 30.7 GM/DL — LOW (ref 32–36)
MCHC RBC-ENTMCNC: 30.7 GM/DL — LOW (ref 32–36)
MCV RBC AUTO: 79.9 FL — LOW (ref 80–100)
MCV RBC AUTO: 79.9 FL — LOW (ref 80–100)
MONOCYTES # BLD AUTO: 1.44 K/UL — HIGH (ref 0–0.9)
MONOCYTES # BLD AUTO: 1.44 K/UL — HIGH (ref 0–0.9)
MONOCYTES NFR BLD AUTO: 10 % — SIGNIFICANT CHANGE UP (ref 2–14)
MONOCYTES NFR BLD AUTO: 10 % — SIGNIFICANT CHANGE UP (ref 2–14)
NEUTROPHILS # BLD AUTO: 11.59 K/UL — HIGH (ref 1.8–7.4)
NEUTROPHILS # BLD AUTO: 11.59 K/UL — HIGH (ref 1.8–7.4)
NEUTROPHILS NFR BLD AUTO: 80.9 % — HIGH (ref 43–77)
NEUTROPHILS NFR BLD AUTO: 80.9 % — HIGH (ref 43–77)
NRBC # BLD: 0 /100 WBCS — SIGNIFICANT CHANGE UP (ref 0–0)
NRBC # BLD: 0 /100 WBCS — SIGNIFICANT CHANGE UP (ref 0–0)
PHOSPHATE SERPL-MCNC: 3.5 MG/DL — SIGNIFICANT CHANGE UP (ref 2.5–4.5)
PHOSPHATE SERPL-MCNC: 3.5 MG/DL — SIGNIFICANT CHANGE UP (ref 2.5–4.5)
PLATELET # BLD AUTO: 347 K/UL — SIGNIFICANT CHANGE UP (ref 150–400)
PLATELET # BLD AUTO: 347 K/UL — SIGNIFICANT CHANGE UP (ref 150–400)
POTASSIUM SERPL-MCNC: 4.6 MMOL/L — SIGNIFICANT CHANGE UP (ref 3.5–5.3)
POTASSIUM SERPL-MCNC: 4.6 MMOL/L — SIGNIFICANT CHANGE UP (ref 3.5–5.3)
POTASSIUM SERPL-MCNC: 4.8 MMOL/L — SIGNIFICANT CHANGE UP (ref 3.5–5.3)
POTASSIUM SERPL-MCNC: 4.8 MMOL/L — SIGNIFICANT CHANGE UP (ref 3.5–5.3)
POTASSIUM SERPL-SCNC: 4.6 MMOL/L — SIGNIFICANT CHANGE UP (ref 3.5–5.3)
POTASSIUM SERPL-SCNC: 4.6 MMOL/L — SIGNIFICANT CHANGE UP (ref 3.5–5.3)
POTASSIUM SERPL-SCNC: 4.8 MMOL/L — SIGNIFICANT CHANGE UP (ref 3.5–5.3)
POTASSIUM SERPL-SCNC: 4.8 MMOL/L — SIGNIFICANT CHANGE UP (ref 3.5–5.3)
PROT SERPL-MCNC: 7.2 G/DL — SIGNIFICANT CHANGE UP (ref 6–8.3)
PROT SERPL-MCNC: 7.2 G/DL — SIGNIFICANT CHANGE UP (ref 6–8.3)
RBC # BLD: 3.99 M/UL — LOW (ref 4.2–5.8)
RBC # BLD: 3.99 M/UL — LOW (ref 4.2–5.8)
RBC # FLD: 18.5 % — HIGH (ref 10.3–14.5)
RBC # FLD: 18.5 % — HIGH (ref 10.3–14.5)
SODIUM SERPL-SCNC: 133 MMOL/L — LOW (ref 135–145)
SODIUM SERPL-SCNC: 133 MMOL/L — LOW (ref 135–145)
SODIUM SERPL-SCNC: 136 MMOL/L — SIGNIFICANT CHANGE UP (ref 135–145)
SODIUM SERPL-SCNC: 136 MMOL/L — SIGNIFICANT CHANGE UP (ref 135–145)
WBC # BLD: 14.34 K/UL — HIGH (ref 3.8–10.5)
WBC # BLD: 14.34 K/UL — HIGH (ref 3.8–10.5)
WBC # FLD AUTO: 14.34 K/UL — HIGH (ref 3.8–10.5)
WBC # FLD AUTO: 14.34 K/UL — HIGH (ref 3.8–10.5)

## 2023-10-24 PROCEDURE — 88305 TISSUE EXAM BY PATHOLOGIST: CPT | Mod: 26

## 2023-10-24 PROCEDURE — 71250 CT THORAX DX C-: CPT | Mod: 26

## 2023-10-24 PROCEDURE — 45380 COLONOSCOPY AND BIOPSY: CPT

## 2023-10-24 PROCEDURE — 99233 SBSQ HOSP IP/OBS HIGH 50: CPT

## 2023-10-24 PROCEDURE — 99232 SBSQ HOSP IP/OBS MODERATE 35: CPT | Mod: GC

## 2023-10-24 RX ORDER — SODIUM CHLORIDE 9 MG/ML
1000 INJECTION, SOLUTION INTRAVENOUS
Refills: 0 | Status: DISCONTINUED | OUTPATIENT
Start: 2023-10-24 | End: 2023-10-26

## 2023-10-24 RX ORDER — SODIUM CHLORIDE 9 MG/ML
1000 INJECTION INTRAMUSCULAR; INTRAVENOUS; SUBCUTANEOUS
Refills: 0 | Status: DISCONTINUED | OUTPATIENT
Start: 2023-10-24 | End: 2023-10-24

## 2023-10-24 RX ORDER — THIAMINE MONONITRATE (VIT B1) 100 MG
100 TABLET ORAL DAILY
Refills: 0 | Status: DISCONTINUED | OUTPATIENT
Start: 2023-10-24 | End: 2023-10-27

## 2023-10-24 RX ADMIN — MORPHINE SULFATE 4 MILLIGRAM(S): 50 CAPSULE, EXTENDED RELEASE ORAL at 01:35

## 2023-10-24 RX ADMIN — Medication 100 MILLIGRAM(S): at 17:59

## 2023-10-24 RX ADMIN — Medication 1 TABLET(S): at 17:59

## 2023-10-24 RX ADMIN — MORPHINE SULFATE 4 MILLIGRAM(S): 50 CAPSULE, EXTENDED RELEASE ORAL at 00:35

## 2023-10-24 RX ADMIN — MORPHINE SULFATE 4 MILLIGRAM(S): 50 CAPSULE, EXTENDED RELEASE ORAL at 23:08

## 2023-10-24 RX ADMIN — SODIUM CHLORIDE 50 MILLILITER(S): 9 INJECTION INTRAMUSCULAR; INTRAVENOUS; SUBCUTANEOUS at 09:47

## 2023-10-24 RX ADMIN — SODIUM CHLORIDE 75 MILLILITER(S): 9 INJECTION, SOLUTION INTRAVENOUS at 17:59

## 2023-10-24 NOTE — PROGRESS NOTE ADULT - PROBLEM SELECTOR PLAN 2
4 weeks of R sided abd pain and early satiety.   - GI consult for colonoscopy 4 weeks of R sided abd pain and early satiety.   - GI consult for colonoscopy. f/u results 4 weeks of R sided abd pain and early satiety.   - GI consult for colonoscopy 10/24

## 2023-10-24 NOTE — DISCHARGE NOTE PROVIDER - PROVIDER TOKENS
PROVIDER:[TOKEN:[[id number]],SCHEDULEDAPPT:[11/09/2023],SCHEDULEDAPPTTIME:[02:30 PM]] PROVIDER:[TOKEN:[[id number]],SCHEDULEDAPPT:[11/09/2023],SCHEDULEDAPPTTIME:[02:30 PM]],PROVIDER:[TOKEN:[[id number]],SCHEDULEDAPPT:[11/01/2023],SCHEDULEDAPPTTIME:[02:00 PM]]

## 2023-10-24 NOTE — DISCHARGE NOTE PROVIDER - ATTENDING DISCHARGE PHYSICAL EXAMINATION:
General: cachectic  Eyes: EOMI intact bilaterally  HENT: dry mucous membranes  Lungs: CTA B/L. No wheezes, rales, or rhonchi  Cardiovascular: RRR. No murmurs, rubs, or gallops  Abdomen: RUQ hardened, enlarged liver, otherwise abd soft NT/ND  Extremities: WWP, No clubbing, cyanosis or edema  MSK: 5/5  strength b/l  Neurological: Alert and oriented x3  Skin: Warm and dry. No obvious rash

## 2023-10-24 NOTE — PROGRESS NOTE ADULT - SUBJECTIVE AND OBJECTIVE BOX
******INCOMPLETE******    Patient is a 63y old  Male who presents with a chief complaint of abdominal pain (23 Oct 2023 18:41)    OVERNIGHT EVENTS/INTERVAL HPI:    REVIEW OF SYSTEMS:  All other review of systems is negative unless indicated above.    OBJECTIVE:  T(C): 36.6 (10-24-23 @ 06:32), Max: 37.7 (10-23-23 @ 20:52)  HR: 110 (10-24-23 @ 06:32) (99 - 110)  BP: 101/64 (10-24-23 @ 06:32) (101/64 - 148/83)  RR: 18 (10-24-23 @ 06:32) (17 - 18)  SpO2: 98% (10-24-23 @ 06:32) (96% - 98%)  Daily     Daily     Physical Exam:  General: in no acute distress  Eyes: EOMI intact bilaterally. Anicteric sclerae, moist conjunctivae  HENT: Moist mucous membranes  Neck: Trachea midline, supple  Lungs: CTA B/L. No wheezes, rales, or rhonchi  Cardiovascular: RRR. No murmurs, rubs, or gallops  Abdomen: Soft, non-tender non-distended; No rebound or guarding  Extremities: WWP, No clubbing, cyanosis or edema  MSK: No midline bony tenderness. No CVA tenderness bilaterally  Neurological: Alert and oriented x3  Skin: Warm and dry. No obvious rash     Medications:  MEDICATIONS  (STANDING):  influenza   Vaccine 0.5 milliLiter(s) IntraMuscular once    MEDICATIONS  (PRN):  acetaminophen     Tablet .. 650 milliGRAM(s) Oral every 6 hours PRN Temp greater or equal to 38C (100.4F), Mild Pain (1 - 3)  aluminum hydroxide/magnesium hydroxide/simethicone Suspension 30 milliLiter(s) Oral every 4 hours PRN Dyspepsia  melatonin 3 milliGRAM(s) Oral at bedtime PRN Insomnia  morphine  - Injectable 4 milliGRAM(s) IV Push every 4 hours PRN Severe Pain (7 - 10)  morphine  IR 15 milliGRAM(s) Oral every 4 hours PRN Moderate Pain (4 - 6)  ondansetron Injectable 4 milliGRAM(s) IV Push every 8 hours PRN Nausea and/or Vomiting      Labs:                        10.5   16.41 )-----------( 278      ( 23 Oct 2023 05:30 )             34.0     10-23    132<L>  |  97  |  24<H>  ----------------------------<  74  4.5   |  21<L>  |  1.28    Ca    9.4      23 Oct 2023 05:30  Phos  2.9     10-23  Mg     2.5     10-23    TPro  7.6  /  Alb  2.9<L>  /  TBili  1.7<H>  /  DBili  x   /  AST  209<H>  /  ALT  37  /  AlkPhos  624<H>  10-23    PT/INR - ( 23 Oct 2023 05:30 )   PT: 12.5 sec;   INR: 1.10          PTT - ( 23 Oct 2023 05:30 )  PTT:27.6 sec  Urinalysis Basic - ( 23 Oct 2023 05:30 )    Color: x / Appearance: x / SG: x / pH: x  Gluc: 74 mg/dL / Ketone: x  / Bili: x / Urobili: x   Blood: x / Protein: x / Nitrite: x   Leuk Esterase: x / RBC: x / WBC x   Sq Epi: x / Non Sq Epi: x / Bacteria: x          Radiology: Reviewed     ******INCOMPLETE******    Patient is a 63y old  Male who presents with a chief complaint of abdominal pain (23 Oct 2023 18:41)    OVERNIGHT EVENTS/INTERVAL HPI: No overnight events reported. Pt seem and examined at bedside this morning. Reports 2/10 abdominal pain, as not had BM since yesterday morning. Plan for colonoscopy and CT chest today. Denies fever, chills, headache, chest pain, SOB, N/V/D/, dysuria.    REVIEW OF SYSTEMS:  All other review of systems is negative unless indicated above.    OBJECTIVE:  T(C): 36.6 (10-24-23 @ 06:32), Max: 37.7 (10-23-23 @ 20:52)  HR: 110 (10-24-23 @ 06:32) (99 - 110)  BP: 101/64 (10-24-23 @ 06:32) (101/64 - 148/83)  RR: 18 (10-24-23 @ 06:32) (17 - 18)  SpO2: 98% (10-24-23 @ 06:32) (96% - 98%)  Daily     Daily     Physical Exam:  General: cachectic  Eyes: EOMI intact bilaterally  HENT: dry mucous membranes  Lungs: CTA B/L. No wheezes, rales, or rhonchi  Cardiovascular: RRR. No murmurs, rubs, or gallops  Abdomen: RUQ hardened, otherwise abd soft NT/ND  Extremities: WWP, No clubbing, cyanosis or edema  MSK: 5/5  strength b/l  Neurological: Alert and oriented x3  Skin: Warm and dry. No obvious rash         MEDICATIONS  (STANDING):  influenza   Vaccine 0.5 milliLiter(s) IntraMuscular once  sodium chloride 0.9%. 1000 milliLiter(s) (50 mL/Hr) IV Continuous <Continuous>    MEDICATIONS  (PRN):  acetaminophen     Tablet .. 650 milliGRAM(s) Oral every 6 hours PRN Temp greater or equal to 38C (100.4F), Mild Pain (1 - 3)  aluminum hydroxide/magnesium hydroxide/simethicone Suspension 30 milliLiter(s) Oral every 4 hours PRN Dyspepsia  melatonin 3 milliGRAM(s) Oral at bedtime PRN Insomnia  morphine  - Injectable 4 milliGRAM(s) IV Push every 4 hours PRN Severe Pain (7 - 10)  morphine  IR 15 milliGRAM(s) Oral every 4 hours PRN Moderate Pain (4 - 6)  ondansetron Injectable 4 milliGRAM(s) IV Push every 8 hours PRN Nausea and/or Vomiting        Labs:                                   9.8    14.34 )-----------( 347      ( 24 Oct 2023 05:30 )             31.9       10-24    133<L>  |  97  |  25<H>  ----------------------------<  89  4.8   |  21<L>  |  1.41<H>      Ca    9.1      24 Oct 2023 05:30  Phos  3.5     10-24  Mg     2.4     10-24    TPro  7.2  /  Alb  2.6<L>  /  TBili  1.7<H>  /  DBili  x   /  AST  240<H>  /  ALT  38  /  AlkPhos  575<H>  10-24        PT/INR - ( 23 Oct 2023 05:30 )   PT: 12.5 sec;   INR: 1.10          PTT - ( 23 Oct 2023 05:30 )  PTT:27.6 sec    Urinalysis Basic - ( 24 Oct 2023 05:30 )    Color: x / Appearance: x / SG: x / pH: x  Gluc: 89 mg/dL / Ketone: x  / Bili: x / Urobili: x   Blood: x / Protein: x / Nitrite: x   Leuk Esterase: x / RBC: x / WBC x   Sq Epi: x / Non Sq Epi: x / Bacteria: x    HIV negative      Radiology: Reviewed       Patient is a 63y old  Male who presents with a chief complaint of abdominal pain (23 Oct 2023 18:41)    OVERNIGHT EVENTS/INTERVAL HPI: No overnight events reported. Pt seem and examined at bedside this morning. Reports 2/10 abdominal pain, as not had BM since yesterday morning. Plan for colonoscopy and CT chest today. Denies fever, chills, headache, chest pain, SOB, N/V/D/, dysuria.    REVIEW OF SYSTEMS:  All other review of systems is negative unless indicated above.    OBJECTIVE:  T(C): 36.6 (10-24-23 @ 06:32), Max: 37.7 (10-23-23 @ 20:52)  HR: 110 (10-24-23 @ 06:32) (99 - 110)  BP: 101/64 (10-24-23 @ 06:32) (101/64 - 148/83)  RR: 18 (10-24-23 @ 06:32) (17 - 18)  SpO2: 98% (10-24-23 @ 06:32) (96% - 98%)  Daily     Daily     Physical Exam:  General: cachectic  Eyes: EOMI intact bilaterally  HENT: dry mucous membranes  Lungs: CTA B/L. No wheezes, rales, or rhonchi  Cardiovascular: RRR. No murmurs, rubs, or gallops  Abdomen: RUQ hardened, otherwise abd soft NT/ND  Extremities: WWP, No clubbing, cyanosis or edema  MSK: 5/5  strength b/l  Neurological: Alert and oriented x3  Skin: Warm and dry. No obvious rash         MEDICATIONS  (STANDING):  influenza   Vaccine 0.5 milliLiter(s) IntraMuscular once  sodium chloride 0.9%. 1000 milliLiter(s) (50 mL/Hr) IV Continuous <Continuous>    MEDICATIONS  (PRN):  acetaminophen     Tablet .. 650 milliGRAM(s) Oral every 6 hours PRN Temp greater or equal to 38C (100.4F), Mild Pain (1 - 3)  aluminum hydroxide/magnesium hydroxide/simethicone Suspension 30 milliLiter(s) Oral every 4 hours PRN Dyspepsia  melatonin 3 milliGRAM(s) Oral at bedtime PRN Insomnia  morphine  - Injectable 4 milliGRAM(s) IV Push every 4 hours PRN Severe Pain (7 - 10)  morphine  IR 15 milliGRAM(s) Oral every 4 hours PRN Moderate Pain (4 - 6)  ondansetron Injectable 4 milliGRAM(s) IV Push every 8 hours PRN Nausea and/or Vomiting        Labs:                                   9.8    14.34 )-----------( 347      ( 24 Oct 2023 05:30 )             31.9       10-24    133<L>  |  97  |  25<H>  ----------------------------<  89  4.8   |  21<L>  |  1.41<H>      Ca    9.1      24 Oct 2023 05:30  Phos  3.5     10-24  Mg     2.4     10-24    TPro  7.2  /  Alb  2.6<L>  /  TBili  1.7<H>  /  DBili  x   /  AST  240<H>  /  ALT  38  /  AlkPhos  575<H>  10-24        PT/INR - ( 23 Oct 2023 05:30 )   PT: 12.5 sec;   INR: 1.10          PTT - ( 23 Oct 2023 05:30 )  PTT:27.6 sec    Urinalysis Basic - ( 24 Oct 2023 05:30 )    Color: x / Appearance: x / SG: x / pH: x  Gluc: 89 mg/dL / Ketone: x  / Bili: x / Urobili: x   Blood: x / Protein: x / Nitrite: x   Leuk Esterase: x / RBC: x / WBC x   Sq Epi: x / Non Sq Epi: x / Bacteria: x    HIV negative      Radiology: Reviewed OVERNIGHT EVENTS/INTERVAL HPI: No overnight events reported. Pt seem and examined at bedside this morning. Reports 2/10 abdominal pain, improved after pain medications. Plan for colonoscopy and CT chest today. Denies fever, chills, headache, chest pain, SOB, N/V/D/, dysuria.    REVIEW OF SYSTEMS:  All other review of systems is negative unless indicated above.    OBJECTIVE:  T(C): 36.6 (10-24-23 @ 06:32), Max: 37.7 (10-23-23 @ 20:52)  HR: 110 (10-24-23 @ 06:32) (99 - 110)  BP: 101/64 (10-24-23 @ 06:32) (101/64 - 148/83)  RR: 18 (10-24-23 @ 06:32) (17 - 18)  SpO2: 98% (10-24-23 @ 06:32) (96% - 98%)  Daily     Daily     Physical Exam:  General: cachectic  Eyes: EOMI intact bilaterally  HENT: dry mucous membranes  Lungs: CTA B/L. No wheezes, rales, or rhonchi  Cardiovascular: RRR. No murmurs, rubs, or gallops  Abdomen: bandage on LUQ. Enlarged liver. otherwise abd soft NT/ND  Extremities: WWP, No clubbing, cyanosis or edema  MSK: 5/5  strength b/l  Neurological: Alert and oriented x3  Skin: Warm and dry. No obvious rash         MEDICATIONS  (STANDING):  influenza   Vaccine 0.5 milliLiter(s) IntraMuscular once  sodium chloride 0.9%. 1000 milliLiter(s) (50 mL/Hr) IV Continuous <Continuous>    MEDICATIONS  (PRN):  acetaminophen     Tablet .. 650 milliGRAM(s) Oral every 6 hours PRN Temp greater or equal to 38C (100.4F), Mild Pain (1 - 3)  aluminum hydroxide/magnesium hydroxide/simethicone Suspension 30 milliLiter(s) Oral every 4 hours PRN Dyspepsia  melatonin 3 milliGRAM(s) Oral at bedtime PRN Insomnia  morphine  - Injectable 4 milliGRAM(s) IV Push every 4 hours PRN Severe Pain (7 - 10)  morphine  IR 15 milliGRAM(s) Oral every 4 hours PRN Moderate Pain (4 - 6)  ondansetron Injectable 4 milliGRAM(s) IV Push every 8 hours PRN Nausea and/or Vomiting        Labs:                                   9.8    14.34 )-----------( 347      ( 24 Oct 2023 05:30 )             31.9       10-24    133<L>  |  97  |  25<H>  ----------------------------<  89  4.8   |  21<L>  |  1.41<H>      Ca    9.1      24 Oct 2023 05:30  Phos  3.5     10-24  Mg     2.4     10-24    TPro  7.2  /  Alb  2.6<L>  /  TBili  1.7<H>  /  DBili  x   /  AST  240<H>  /  ALT  38  /  AlkPhos  575<H>  10-24        PT/INR - ( 23 Oct 2023 05:30 )   PT: 12.5 sec;   INR: 1.10          PTT - ( 23 Oct 2023 05:30 )  PTT:27.6 sec    Urinalysis Basic - ( 24 Oct 2023 05:30 )    Color: x / Appearance: x / SG: x / pH: x  Gluc: 89 mg/dL / Ketone: x  / Bili: x / Urobili: x   Blood: x / Protein: x / Nitrite: x   Leuk Esterase: x / RBC: x / WBC x   Sq Epi: x / Non Sq Epi: x / Bacteria: x    HIV negative      Radiology: Reviewed

## 2023-10-24 NOTE — PRE-ANESTHESIA EVALUATION ADULT - NSANTHPEFT_GEN_ALL_CORE
Alert, cachectic, pleasant in no distress  CN grossly intact  Extremities warm without edema  Lungs clear to auscultation  RRR

## 2023-10-24 NOTE — PROGRESS NOTE ADULT - ASSESSMENT
64 yo M with no pmh who pw abdominal pain and unintentional weight loss for 4 weeks.  CT ab/pelv on arrival revealed low rectal mass and hepatomegaly w/ innumerable masses c/w extensive mets. Palliative consulted for complex medical decision making / symptom management in the setting of advanced illness.      - On discharge, patient should follow-up outpatient with palliative care provider, Dr. Kumari at  [address]  - Please email [email or web address] with patient's name, MRN and Dr. Kumari's name to request an outpatient appointment and call #[phone] to schedule appointment

## 2023-10-24 NOTE — PROGRESS NOTE ADULT - PROBLEM SELECTOR PLAN 4
.  CT abd and pelvis with rectal mass concerning for malignancy, periportal lymphadenopathy, and multiple liver lesions. Ecog 2.   - pending IR biopsy and possible colonoscopy  - If no further disease modifying treatments offered or patient/family declined further disease modifying treatments,  patient would qualify for hospice.

## 2023-10-24 NOTE — PROGRESS NOTE ADULT - PROBLEM SELECTOR PLAN 5
Cr 1.37-->1.28, (Labs from 10/12 1.09). UA with casts. possibly in setting of poor PO intake. s/p 1L NS Cr 1.28 on 10/23  -bladder scan: retaining 148 cc Cr 1.37-->1.28, (Labs from 10/12 1.09). UA with casts. possibly in setting of poor PO intake. s/p 1L NS Cr 1.28 on 10/23  -bladder scan 10/23: retaining 148 cc Cr 1.28-->1.41, (Labs from 10/12 1.09). UA with casts. possibly in setting of poor PO intake. s/p 1L NS Cr 1.28 on 10/23  -bladder scan 10/23: retaining 148 cc

## 2023-10-24 NOTE — PROGRESS NOTE ADULT - ASSESSMENT
62 yo M w/ no significant PMHx who recently saw Dr. Julian in clinic for initial evaluation of abdominal bulge and significant unintentional weight loss. On exam, afebrile, hemodynamically stable, abdomen soft in lower quadrants but firm in upper abdomen with concern for abdominal mass, no hernia appreciated, non tender, nondistended, no rebound or guarding. Labs notable for leukocytosis with L shift, transaminitis, elevated ALP and hyperbilrubinemia. CT A/P with PO & IV contrast notable for low rectal mass s/f rectal ca, hepatomegaly w/ innumerable masses c/w extensive mets,     Plan/Recs:   - Medical oncology consult  - f/u CT chest   - f/u colonoscopy   - f/u IR biopsy  - f/u MRI  - Colorectal surgery (team 5c) following

## 2023-10-24 NOTE — DISCHARGE NOTE PROVIDER - NSDCFUSCHEDAPPT_GEN_ALL_CORE_FT
Doctor, Unknown  [facility] PreAdmits  Scheduled Appointment: 10/26/2023    [facility] Physician Adenike GOMEZ  [address]  Scheduled Appointment: 10/26/2023     Doctor, Unknown  [facility] PreAdmits  Scheduled Appointment: 10/26/2023    [facility]  CATSCAN  [address]  Scheduled Appointment: 10/26/2023    Sherie Khan  [facility]  HEMON[address]  Scheduled Appointment: 10/31/2023     Doctor, Unknown  [facility] PreAdmits  Scheduled Appointment: 10/26/2023    [facility]  CATSCAN  [address]  Scheduled Appointment: 10/26/2023    Sherie Khan  [facility]  HEMONC [address]  Scheduled Appointment: 10/31/2023    Karina Duke  [facility]  GASTRO [address]  Scheduled Appointment: 11/09/2023     Doctor, Unknown  [facility] PreAdmits  Scheduled Appointment: 10/26/2023    [facility] Physician [facility]  CATSCAN  [address]  Scheduled Appointment: 10/26/2023    Sherie Khan  [facility] Physician [facility]  HEMONC [address]  Scheduled Appointment: 10/31/2023    [facility]ician [facility]  PALLIATIVE [address]  Scheduled Appointment: 11/01/2023    Karina Duke  [facility] Physician [facility]  GASTR[address]  Scheduled Appointment: 11/09/2023     Sherie Khan  [facility]  HEMONC [address]  Scheduled Appointment: 10/31/2023    [facility]  PALLIATIVE [address]  Scheduled Appointment: 11/01/2023    Karina Duke  [facility] Physician [facility]  GASTRO [address]  Scheduled Appointment: 11/09/2023

## 2023-10-24 NOTE — DISCHARGE NOTE PROVIDER - DETAILS OF MALNUTRITION DIAGNOSIS/DIAGNOSES
This patient has been assessed with a concern for Malnutrition and was treated during this hospitalization for the following Nutrition diagnosis/diagnoses:     -  10/23/2023: Severe protein-calorie malnutrition   -  10/23/2023: Underweight (BMI < 19)

## 2023-10-24 NOTE — PROGRESS NOTE ADULT - PROBLEM SELECTOR PLAN 5
.  - [facility] 10/23  - HCP completed naming sister Irma Barbour [phone]  - Full code

## 2023-10-24 NOTE — DISCHARGE NOTE PROVIDER - NSDCMRMEDTOKEN_GEN_ALL_CORE_FT
Dilaudid 2 mg oral tablet: 1 tab(s) orally every 4 hours as needed for  moderate pain Take 1 (one) tablet every 4 hours as needed for moderate pain. Take 2 (two) tablets every 4 hours as needed for severe pain. MDD: 8 mg  Narcan 4 mg/0.1 mL nasal spray: 4 milligram(s) intranasally once a day as needed for opioid overdose Spray 0.1 mL into one nostril. As Needed for opioid overdose. Repeat with second device into other nostril after 2-3 minutes if no or minimal response

## 2023-10-24 NOTE — PROGRESS NOTE ADULT - ATTENDING COMMENTS
Pt is 64 y/o male with no pmh presented to ED w/ abdominal pain for 4 weeks. Pt has lost 20 lbs in past 8 weeks and has complained of "pencil like" stools. CT ab/pelv on arrival revealed low rectal mass and hepatomegaly w/ innumerable masses c/w extensive mets.    #Rectal mass   #Liver mets   #Elevated Cr    - s/p Liver biopsy and Colonoscopy. Follow path reports   - Palliative consulted for pain management, recs appreciated  - CEA, CA19 and  elevated, likely rectal Ca w/ mets to the liver.   - heme/onc consulted to establish care and further work up outpatient   - C/w IV fluids and repeat BMP @ 4pm. If downtrending will discharge     Rest as per resident note.

## 2023-10-24 NOTE — CHART NOTE - NSCHARTNOTEFT_GEN_A_CORE
Patient is s/p colonoscopy in endoscopy unit for rectal mass and liver masses noted on CT.    Findings:  - A rectal mass was noted. Mass started at the anal verge and was 3.5 cm in length. It was non-obstructing, measuring approximately 5-7 cm in width.   - It was highly concerning for malignancy and was very friable. Multiple cold forceps biopsies were performed for histology in the rectal mass. These biopsies were done evaluate for malignancy in setting of known liver masses.	  - Luminal narrowing and tight angulations, possibly due to mass effect from lymphadenopathy.    Recommendations:   - Advance diet as tolerated  - Await pathology results.  - Return to floor for further management  - Follow-up results of liver biopsy  - Follow up imaging for staging and medical oncology recommendations    Case discussed with Dr. Dong. GI team will continue to follow.     Tali Alatorre D.O.   Gastroenterology Fellow  Weekday 7am-5pm Pager: [phone]  Weeknights/Weekend/Holiday Coverage: Please call the  for contact info

## 2023-10-24 NOTE — PROGRESS NOTE ADULT - ASSESSMENT
Pt is 64 y/o male with no pmh presented to ED w/ abdominal pain for 4 weeks. Pt has lost 20 lbs in past 8 weeks and has complained of "pencil like" stools. CT ab/pelv on arrival revealed low rectal mass and hepatomegaly w/ innumerable masses c/w extensive mets.   Pt is 64 y/o male with no pmh presented to ED w/ abdominal pain for 4 weeks. Pt has lost 20 lbs in past 8 weeks and has complained of "pencil like" stools. CT ab/pelv on arrival revealed low rectal mass and hepatomegaly w/ innumerable masses c/w extensive mets. Being worked up for malignancy.

## 2023-10-24 NOTE — PROGRESS NOTE ADULT - PROBLEM SELECTOR PLAN 6
.  Complex medical decision making / symptom management in the setting of advanced illness.    PO Pain recs in anticipation of dc as above  GOC 10/23    Coping:  well[ x ] with difficulty[  ] poor coping[  ] unable to assess[  ]   Support system: strong[  ] adequate[ x ] inadequate[ x ]    Active Psychosocial Referrals:  SW/CM[ x ] PT/OT[ x ] Hospice[  ]  Ethics[  ]  [facility] Patient/Family Support[  ] Chaplaincy[   ]  Massage Therapy[ x ] Music Therapy [x  ] Holistic RN[  ]    Active Outpatient Palliative Care Referrals:  Supportive Oncology Clinic [x ]    - For new or uncontrolled symptoms, please call Palliative Care at [phone]. The service is available 24/7 (including nights & weekends) to provide symptom management recommendations over the phone as appropriate

## 2023-10-24 NOTE — PROGRESS NOTE ADULT - PROBLEM SELECTOR PLAN 3
.  pps 50%, requires assistance with some ADLs. lives alone with limited social support to assist with intermediate care.   - cw supportive care, skin care, encourage OOB  - PT cs  - Anticipate pt will need HHA support in future if pt choses to pursue DMTx here in [place]

## 2023-10-24 NOTE — DISCHARGE NOTE PROVIDER - HOSPITAL COURSE
· Assessment	  Pt is 62 y/o male with no pmh presented to ED w/ abdominal pain for 4 weeks. Pt has lost 20 lbs in past 8 weeks and has complained of "pencil like" stools. CT ab/pelv on arrival revealed low rectal mass and hepatomegaly w/ innumerable masses c/w extensive mets.         Problem/Plan - 1:  ·  Problem: Rectal mass.   ·  Plan: CT abd and pelvis with rectal mass concerning for malignancy, periportal lymphadenopathy, and multiple liver lesions. Pt following with Dr. Julian and had elevated CEA and CA19 on outpatient labs. Concern for malignancy given lesions in liver. Hep panel negative. Elevated CEA, CA 19.9, , AFP wnl. s/p .2mg IV dilaudid  -IR for biopsy  -Per surgery, f/u CT chest and MRI pelvis  - f/u GI consult for colonoscopy   -f/u Palliative consult  -[facility] consulted- recommend HIV.     Problem/Plan - 2:  ·  Problem: Abdominal pain.   ·  Plan: 4 weeks of R sided abd pain and early satiety.   - GI consult for colonoscopy.     Problem/Plan - 3:  ·  Problem: Transaminitis.   ·  Plan: Pt with elevated alk phos 746 (elevated from 10/12 bloodwork) and , ALT 41. CT abd with liver lesions and small to moderate ascites. likely secondary to metastatic lesions vs infectious process. Hep panel neg  -PT 12.5 INR 1.10 PTT 27.6  - IR consult for liver biopsy.     Problem/Plan - 4:  ·  Problem: Systemic inflammatory response syndrome (SIRS).   ·  Plan: , WBC 16.4, afebrile, no cough, no likely source of infection. UA few bacteria. Likely reactive to malignancy   -trend wbc.     Problem/Plan - 5:  ·  Problem: JOSEFINA (acute kidney injury).   ·  Plan: Cr 1.37-->1.28, (Labs from 10/12 1.09). UA with casts. possibly in setting of poor PO intake. s/p 1L NS Cr 1.28 on 10/23  -bladder scan: retaining 148 cc.      Patient was discharged to: home    New medications:   Changes to old medications: none  Medications that were stopped: none    Items to follow up as outpatient: Please follow up with [facility] in 1-2 weeks to go over your imaging and biopsy results    Physical exam at the time of discharge:  General: cachectic  Eyes: EOMI intact bilaterally  HENT: dry mucous membranes  Lungs: CTA B/L. No wheezes, rales, or rhonchi  Cardiovascular: RRR. No murmurs, rubs, or gallops  Abdomen: RUQ hardened, enlarged liver, otherwise abd soft NT/ND  Extremities: WWP, No clubbing, cyanosis or edema  MSK: 5/5  strength b/l  Neurological: Alert and oriented x3  Skin: Warm and dry. No obvious rash          Pt is 62 y/o male with no pmh presented to ED w/ abdominal pain for 4 weeks. Pt has lost 20 lbs in past 8 weeks and has complained of "pencil like" stools. CT ab/pelv on arrival revealed low rectal mass and hepatomegaly w/ innumerable masses c/w extensive mets.      Rectal mass.   ·  Plan: CT abd and pelvis with rectal mass concerning for malignancy, periportal lymphadenopathy, and multiple liver lesions. Pt following with Dr. Julian and had elevated CEA and CA19 on outpatient labs. Concern for malignancy given lesions in liver. Hep panel negative. Elevated CEA, CA 19.9, , AFP wnl. s/p .2mg IV dilaudid  -IR for biopsy  -Per surgery, f/u CT chest and MRI pelvis  - f/u GI consult for colonoscopy   -f/u Palliative consult  -hemeonc consulted- recommend HIV.     Problem/Plan - 2:  ·  Problem: Abdominal pain.   ·  Plan: 4 weeks of R sided abd pain and early satiety.   - GI consult for colonoscopy.     Problem/Plan - 3:  ·  Problem: Transaminitis.   ·  Plan: Pt with elevated alk phos 746 (elevated from 10/12 bloodwork) and , ALT 41. CT abd with liver lesions and small to moderate ascites. likely secondary to metastatic lesions vs infectious process. Hep panel neg  -PT 12.5 INR 1.10 PTT 27.6  - IR consult for liver biopsy.     Problem/Plan - 4:  ·  Problem: Systemic inflammatory response syndrome (SIRS).   ·  Plan: , WBC 16.4, afebrile, no cough, no likely source of infection. UA few bacteria. Likely reactive to malignancy   -trend wbc.     Problem/Plan - 5:  ·  Problem: JOSEFINA (acute kidney injury).   ·  Plan: Cr 1.37-->1.28, (Labs from 10/12 1.09). UA with casts. possibly in setting of poor PO intake. s/p 1L NS Cr 1.28 on 10/23  -bladder scan: retaining 148 cc.      Patient was discharged to: home    New medications:   Changes to old medications: none  Medications that were stopped: none    Items to follow up as outpatient: Please follow up with [facility] in 1-2 weeks to go over your imaging and biopsy results    Physical exam at the time of discharge:  General: cachectic  Eyes: EOMI intact bilaterally  HENT: dry mucous membranes  Lungs: CTA B/L. No wheezes, rales, or rhonchi  Cardiovascular: RRR. No murmurs, rubs, or gallops  Abdomen: RUQ hardened, enlarged liver, otherwise abd soft NT/ND  Extremities: WWP, No clubbing, cyanosis or edema  MSK: 5/5  strength b/l  Neurological: Alert and oriented x3  Skin: Warm and dry. No obvious rash          Pt is 62 y/o male with no pmh presented to ED w/ abdominal pain for 4 weeks. Pt has lost 20 lbs in past 8 weeks and has complained of "pencil like" stools. CT ab/pelv on arrival revealed low rectal mass and hepatomegaly w/ innumerable masses c/w extensive mets.    Rectal mass.   CT abd and pelvis with rectal mass concerning for malignancy, periportal lymphadenopathy, and multiple liver lesions. Pt following with Dr. Julian and had elevated CEA and CA19 on outpatient labs. Concern for malignancy given lesions in liver. Hep panel negative. Elevated CEA, CA 19.9, , AFP wnl. s/p .2mg IV dilaudid. Colonoscopy demonstrated protruding rectal mass 3.5cm in lenth and 5-7cm width. highly concerning for malignancy and was friable. Mass was biopsied. Please follow up with hematology outpatient.     Abdominal pain.   4 weeks of R sided abd pain and early satiety. Colonoscopy and CT results demonstrated diffuse lesions in the liver as well as a rectal mass. Follow up with outpatient provider.     Transaminitis.   Patient with elevated alk phos 746 (elevated from 10/12 bloodwork) and , ALT 41. CT abd with liver lesions and small to moderate ascites. likely secondary to metastatic lesions vs infectious process. Hep panel neg. follow up with Hematology and primary care physician regarding liver biopsy results     Systemic inflammatory response syndrome (SIRS).   Patient met 2/4 SIRS criteria with , WBC 16.4, afebrile, no cough, no likely source of infection. UA few bacteria. Likely reactive to malignancy. CT chest demonstrated small multiple nodules that are unlikely to be metastasis, with small bilateral pleural effusions. Multiple liver nodules likely to be metastatic lesions.   Follow up with hematologist outpatient     JOSEFINA (acute kidney injury).   Cr 1.37-->1.28, (Labs from 10/12 1.09). UA with casts. possibly in setting of poor PO intake. s/p 1L NS Cr 1.28 on 10/23  Bladder scan demonstrated urinary retention likely secondary to rectal mass.       Patient was discharged to: home    New medications:   Changes to old medications: none  Medications that were stopped: none    Items to follow up as outpatient: [facility] to go over your imaging and biopsy results    Physical exam at the time of discharge:  General: cachectic  Eyes: EOMI intact bilaterally  HENT: dry mucous membranes  Lungs: CTA B/L. No wheezes, rales, or rhonchi  Cardiovascular: RRR. No murmurs, rubs, or gallops  Abdomen: RUQ hardened, enlarged liver, otherwise abd soft NT/ND  Extremities: WWP, No clubbing, cyanosis or edema  MSK: 5/5  strength b/l  Neurological: Alert and oriented x3  Skin: Warm and dry. No obvious rash          Pt is 62 y/o male with no pmh presented to ED w/ abdominal pain for 4 weeks. CT ab/pelv on arrival revealed low rectal mass and hepatomegaly w/ innumerable masses c/w extensive mets.    Rectal mass.   CT abd and pelvis with rectal mass concerning for malignancy, periportal lymphadenopathy, and multiple liver lesions. Pt following with Dr. Julian and had elevated CEA and CA19 on outpatient labs. Concern for malignancy given lesions in liver. Hep panel negative. Elevated CEA, CA 19.9, , AFP wnl. s/p .2mg IV dilaudid. Colonoscopy demonstrated protruding rectal mass 3.5cm in lenth and 5-7cm width. highly concerning for malignancy and was friable. Mass was biopsied. Please follow up with hematology outpatient.     Abdominal pain.   4 weeks of R sided abd pain and early satiety. Colonoscopy and CT results demonstrated diffuse lesions in the liver as well as a rectal mass. Follow up with outpatient provider.     Transaminitis.   Patient with elevated alk phos 746 (elevated from 10/12 bloodwork) and , ALT 41. CT abd with liver lesions and small to moderate ascites. likely secondary to metastatic lesions vs infectious process. Hep panel neg. follow up with Hematology and primary care physician regarding liver biopsy results     Systemic inflammatory response syndrome (SIRS).   Patient met 2/4 SIRS criteria with , WBC 16.4, afebrile, no cough, no likely source of infection. UA few bacteria. Likely reactive to malignancy. CT chest demonstrated small multiple nodules that are unlikely to be metastasis, with small bilateral pleural effusions. Multiple liver nodules likely to be metastatic lesions.   Follow up with hematologist outpatient     JOSEFINA (acute kidney injury).   Cr 1.37-->1.28, (Labs from 10/12 1.09). UA with casts. possibly in setting of poor PO intake. s/p 1L NS Cr 1.28 on 10/23  Bladder scan demonstrated urinary retention likely secondary to rectal mass.       Patient was discharged to: home    New medications:   Changes to old medications: none  Medications that were stopped: none    Items to follow up as outpatient: Please follow up with [facility] in 1-2 weeks to go over your imaging and biopsy results    Physical exam at the time of discharge:  General: cachectic  Eyes: EOMI intact bilaterally  HENT: dry mucous membranes  Lungs: CTA B/L. No wheezes, rales, or rhonchi  Cardiovascular: RRR. No murmurs, rubs, or gallops  Abdomen: RUQ hardened, enlarged liver, otherwise abd soft NT/ND  Extremities: WWP, No clubbing, cyanosis or edema  MSK: 5/5  strength b/l  Neurological: Alert and oriented x3  Skin: Warm and dry. No obvious rash          Pt is 62 y/o male with no pmh presented to ED w/ abdominal pain for 4 weeks. CT ab/pelv on arrival revealed low rectal mass and hepatomegaly w/ innumerable masses c/w extensive mets. Liver biopsy confirmed adenocarcinoma likely colonic origin. Stable for discharge with close outpatient follow up with oncology and pain management.     Rectal mass.   CT abd and pelvis with rectal mass concerning for malignancy, periportal lymphadenopathy, and multiple liver lesions. Pt following with Dr. Julian and had elevated CEA and CA19 on outpatient labs. Concern for malignancy given lesions in liver. Hep panel negative. Elevated CEA, CA 19.9, , AFP wnl. s/p .2mg IV dilaudid. IR liver biopsy adenocarcinoma with necrosis, likely colonic origin. Colonoscopy demonstrated protruding rectal mass 3.5cm in lenth and 5-7cm width. highly concerning for malignancy and was friable. Mass was biopsied. CT chest demonstrated small multiple nodules that are unlikely to be metastasis, with small bilateral pleural effusions. Multiple liver nodules likely to be metastatic lesions. Please follow up with oncology and GI outpatient.     Abdominal pain.   4 weeks of R sided abd pain and early satiety. Colonoscopy and CT results demonstrated diffuse lesions in the liver as well as a rectal mass. Palliative following. Recommend follow outpatient with Dr. Machado.   Pt now on PO dilaudid 2 q4 for PRN moderate pain, and PO dilaudid 4mg q4 prn for severe pain.    Transaminitis.   Patient with elevated alk phos 746 (elevated from 10/12 bloodwork) and , ALT 41. CT abd with liver lesions and small to moderate ascites. likely secondary to metastatic lesions vs infectious process. Hep panel neg. follow up with Hematology and primary care physician regarding liver biopsy results     Systemic inflammatory response syndrome (SIRS).   Patient met 2/4 SIRS criteria with , WBC 16.4, afebrile, no cough, no likely source of infection. UA few bacteria. Likely reactive to malignancy.   Follow up with oncologist outpatient.     JOSEFINA (acute kidney injury).   JOSEFINA secondary to contrast induced ATN. Cr 1.37-->1.28, (Labs from 10/12 1.09). UA with casts. possibly in setting of poor PO intake. s/p 1L NS Cr 1.28 on 10/23. Bladder scan demonstrated urinary retention likely secondary to rectal mass. CT chest with striated nephrograms  present in kidneys bilaterally, which may be a sign of acute tubular necrosis.  - Cr 10/24 elevated to 1.48. Encourage PO fluid intake and repeat Cr _____      Patient was discharged to: home    New medications:   Dilaudid 2 mg oral tablet  Take 1 (one) tablet every 4 hours as needed for moderate pain.   Take 2 (two) tablets every 4 hours as needed for severe pain. MDD: 8 mg  Changes to old medications: none  Medications that were stopped: none    Items to follow up as outpatient: Please follow up with [facility] in 1-2 weeks to go over treatment plan.  Palliative on 11/1 for pain management    Physical exam at the time of discharge:  General: cachectic  Eyes: EOMI intact bilaterally  HENT: dry mucous membranes  Lungs: CTA B/L. No wheezes, rales, or rhonchi  Cardiovascular: RRR. No murmurs, rubs, or gallops  Abdomen: RUQ hardened, enlarged liver, otherwise abd soft NT/ND  Extremities: WWP, No clubbing, cyanosis or edema  MSK: 5/5  strength b/l  Neurological: Alert and oriented x3  Skin: Warm and dry. No obvious rash          Pt is 62 y/o male with no pmh presented to ED w/ abdominal pain for 4 weeks. CT ab/pelv on arrival revealed low rectal mass and hepatomegaly w/ innumerable masses c/w extensive mets. Liver biopsy confirmed adenocarcinoma likely colonic origin. Stable for discharge with close outpatient follow up with oncology and pain management.     Rectal mass.   CT abd and pelvis with rectal mass concerning for malignancy, periportal lymphadenopathy, and multiple liver lesions. Pt following with Dr. Julian and had elevated CEA and CA19 on outpatient labs. Concern for malignancy given lesions in liver. Hep panel negative. Elevated CEA, CA 19.9, , AFP wnl. s/p .2mg IV dilaudid. IR liver biopsy adenocarcinoma with necrosis, likely colonic origin. Colonoscopy demonstrated protruding rectal mass 3.5cm in lenth and 5-7cm width. highly concerning for malignancy and was friable. Mass was biopsied. CT chest demonstrated small multiple nodules that are unlikely to be metastasis, with small bilateral pleural effusions. Multiple liver nodules likely to be metastatic lesions. Please follow up with oncology and GI outpatient.     Abdominal pain.   4 weeks of R sided abd pain and early satiety. Colonoscopy and CT results demonstrated diffuse lesions in the liver as well as a rectal mass. Palliative following. Recommend follow outpatient with Dr. Machado.   Pt now on PO dilaudid 2 q4 for PRN moderate pain, and PO dilaudid 4mg q4 prn for severe pain.    Transaminitis.   Patient with elevated alk phos 746 (elevated from 10/12 bloodwork) and , ALT 41. CT abd with liver lesions and small to moderate ascites. likely secondary to metastatic lesions vs infectious process. Hep panel neg. follow up with Hematology and primary care physician regarding liver biopsy results     Systemic inflammatory response syndrome (SIRS).   Patient met 2/4 SIRS criteria with , WBC 16.4, afebrile, no cough, no likely source of infection. UA few bacteria. Likely reactive to malignancy.   Follow up with oncologist outpatient.     JOSEFINA (acute kidney injury).   JOSEFINA secondary to contrast induced ATN. Cr 1.37-->1.28, (Labs from 10/12 1.09). UA with casts. possibly in setting of poor PO intake. s/p 1L NS Cr 1.28 on 10/23. Bladder scan demonstrated urinary retention likely secondary to rectal mass. CT chest with striated nephrograms  present in kidneys bilaterally, which may be a sign of acute tubular necrosis.  - Cr 10/24 elevated to 1.48, 10/25 1.7. Encourage PO fluid intake and received NS + NaHCO3 300ccs with repeat Cr 1.56.       Patient was discharged to: home    New medications:   Dilaudid 2 mg oral tablet  Take 1 (one) tablet every 4 hours as needed for moderate pain.   Take 2 (two) tablets every 4 hours as needed for severe pain. MDD: 8 mg  Changes to old medications: none  Medications that were stopped: none    Items to follow up as outpatient: Please follow up with [facility] in 1-2 weeks to go over treatment plan.  Palliative on 11/1 for pain management    Physical exam at the time of discharge:  General: cachectic  Eyes: EOMI intact bilaterally  HENT: dry mucous membranes  Lungs: CTA B/L. No wheezes, rales, or rhonchi  Cardiovascular: RRR. No murmurs, rubs, or gallops  Abdomen: RUQ hardened, enlarged liver, otherwise abd soft NT/ND  Extremities: WWP, No clubbing, cyanosis or edema  MSK: 5/5  strength b/l  Neurological: Alert and oriented x3  Skin: Warm and dry. No obvious rash          Pt is 62 y/o male with no pmh presented to ED w/ abdominal pain for 4 weeks. CT ab/pelv on arrival revealed low rectal mass and hepatomegaly w/ innumerable masses c/w extensive mets. Liver biopsy confirmed adenocarcinoma likely colonic origin. Stable for discharge with close outpatient follow up with oncology and pain management.     Rectal adenocarcinoma metastatic to liver  CT abd and pelvis with rectal mass concerning for malignancy, periportal lymphadenopathy, and multiple liver lesions. Pt following with Dr. Julian and had elevated CEA and CA19 on outpatient labs. Concern for malignancy given lesions in liver. Hep panel negative. Elevated CEA, CA 19.9, , AFP wnl. s/p .2mg IV dilaudid. IR liver biopsy adenocarcinoma with necrosis, likely colonic origin. Colonoscopy demonstrated protruding rectal mass 3.5cm in lenth and 5-7cm width. highly concerning for malignancy and was friable. Mass was biopsied. CT chest demonstrated small multiple nodules that are unlikely to be metastasis, with small bilateral pleural effusions. Multiple liver nodules likely to be metastatic lesions. Please follow up with oncology and GI outpatient.     Severe protein Calorie malnutrition     Abdominal pain.   4 weeks of R sided abd pain and early satiety. Colonoscopy and CT results demonstrated diffuse lesions in the liver as well as a rectal mass. Palliative following. Recommend follow outpatient with Dr. Machado.   Pt now on PO dilaudid 2 q4 for PRN moderate pain, and PO dilaudid 4mg q4 prn for severe pain.    Transaminitis.   Patient with elevated alk phos 746 (elevated from 10/12 bloodwork) and , ALT 41. CT abd with liver lesions and small to moderate ascites. likely secondary to metastatic lesions vs infectious process. Hep panel neg. follow up with Hematology and primary care physician regarding liver biopsy results     Systemic inflammatory response syndrome (SIRS).   Patient met 2/4 SIRS criteria with , WBC 16.4, afebrile, no cough, no likely source of infection. UA few bacteria. Likely reactive to malignancy.   Follow up with oncologist outpatient.     ATN  JOSEFINA secondary to contrast induced ATN. Cr 1.37-->1.28, (Labs from 10/12 1.09). UA with casts. possibly in setting of poor PO intake. s/p 1L NS Cr 1.28 on 10/23. Bladder scan demonstrated urinary retention likely secondary to rectal mass. CT chest with striated nephrograms  present in kidneys bilaterally, which may be a sign of acute tubular necrosis.  - Cr 10/24 elevated to 1.48, 10/25 1.7. Encourage PO fluid intake and received NS + NaHCO3 300ccs with repeat Cr 1.56.       Patient was discharged to: home    New medications:   Dilaudid 2 mg oral tablet  Take 1 (one) tablet every 4 hours as needed for moderate pain.   Take 2 (two) tablets every 4 hours as needed for severe pain. MDD: 8 mg  Changes to old medications: none  Medications that were stopped: none    Items to follow up as outpatient: Please follow up with [facility] in 1-2 weeks to go over treatment plan.  Palliative on 11/1 for pain management    Physical exam at the time of discharge:  General: cachectic  Eyes: EOMI intact bilaterally  HENT: dry mucous membranes  Lungs: CTA B/L. No wheezes, rales, or rhonchi  Cardiovascular: RRR. No murmurs, rubs, or gallops  Abdomen: RUQ hardened, enlarged liver, otherwise abd soft NT/ND  Extremities: WWP, No clubbing, cyanosis or edema  MSK: 5/5  strength b/l  Neurological: Alert and oriented x3  Skin: Warm and dry. No obvious rash

## 2023-10-24 NOTE — PROGRESS NOTE ADULT - PROBLEM SELECTOR PLAN 4
, WBC 16.4, afebrile, no cough, no likely source of infection. UA few bacteria. Likely reactive to malignancy   -trend wbc , WBC 16.4, afebrile, no cough, no likely source of infection. UA few bacteria. Likely reactive to malignancy   -started sodium chloride 0.9%. 1000 milliLiter(s) (50 mL/Hr) IV Continuous  -trend wbc

## 2023-10-24 NOTE — PROGRESS NOTE ADULT - SUBJECTIVE AND OBJECTIVE BOX
SUBJECTIVE/OBJECTIVE: Interval events noted. Pt seen following colonoscopy today. Endorses pain is acceptably controlled. pt required MS 4 mg IVP x1 in the last 24 hrs for management of acute pain, which was effective. No unexpected adverse effects of opiates noted: no sedation/dizziness/nausea/myoclonus. Fatigue fair. Appetite fair. Comprehensive symptom assessment. Extensive time spent discussing plan of care with patient, reviewed PO analgesic regimen in anticipation of dc this afternoon.      ALLERGIES: No Known Allergies      MEDICATIONS: REVIEWED  MEDICATIONS  (STANDING):  dextrose 5% + sodium chloride 0.45%. 1000 milliLiter(s) (75 mL/Hr) IV Continuous <Continuous>  influenza   Vaccine 0.5 milliLiter(s) IntraMuscular once  multivitamin 1 Tablet(s) Oral daily  thiamine 100 milliGRAM(s) Oral daily    MEDICATIONS  (PRN):  acetaminophen     Tablet .. 650 milliGRAM(s) Oral every 6 hours PRN Temp greater or equal to 38C (100.4F), Mild Pain (1 - 3)  aluminum hydroxide/magnesium hydroxide/simethicone Suspension 30 milliLiter(s) Oral every 4 hours PRN Dyspepsia  melatonin 3 milliGRAM(s) Oral at bedtime PRN Insomnia  morphine  - Injectable 4 milliGRAM(s) IV Push every 4 hours PRN Severe Pain (7 - 10)  morphine  IR 15 milliGRAM(s) Oral every 4 hours PRN Moderate Pain (4 - 6)  ondansetron Injectable 4 milliGRAM(s) IV Push every 8 hours PRN Nausea and/or Vomiting      PRESENT SYMPTOMS:   [ ]Unable to obtain due to poor mentation   Source if other than patient:  [ ]Family   [ ]Team     Pain [ x ]  Some interference with ADLs   Location :      RUQ  Quality:  deep ache, pushing   Radiation: diffuse Right side   Timing: constant with BTP  Aggravating factors: lying on R side, deep palpation   Minimal acceptable level (0-10 scale): 2/10  Severity in last 24h (0-10 scale) : 8/10  Current score (0-10 scale): 2/10  Improves with:  MS ivp    PAIN AD Score:  0  [email or web address] (press ctrl +  left click to view)    Analgesic Use (PRNs) for past 24 hours:  - MS 4 mg IVP x1     If [  ], pt denies symptom.   Dyspnea:         [  ]  Anxiety:           [  ]  Difficulty sleeping: [  ]  Fatigue:           [ x ]  Nausea:           [  x] intermittent prior to admission, has been without over last 24hs   Loss of appetite:     [  ] good/fair appetite, severe early satiety   Dysphagia: [  ]  Constipation:   [  ]        LBM 10/23  Grief Present   [  ] Yes   [ x ] No   Other Symptoms:    All other review of systems negative [ x ]    ECOG Performance:     2  Current Palliative Performance Scale/Karnofsky Score:   50 %  Preadmit Karnofsky:  60/70 %          PEx:  General: cachectic man sitting up in bed, NAD  alert  oriented x  3   verbal   Behavioral: Calm pleasant   HEENT: atraumatic,  + temporal wasting,  No dry mouth  RESP: Reg rhythm, No  tachypnea/labored breathing,  No audible excessive secretions,  CTAB, diminished bilat bases  CV: RRR, S1S2,  +tachycardia  GI: flat, soft lower quadrants, semi firm RUQ, mild tenderness to palpation, hypoactive bs   MUSK: weakness x4,No  edema, ambulatory   SKIN: No abnormal skin lesions, Poor skin turgor, mild jaundice   NEURO:  No deficits, cognitive impairment, encephalopathic dsyphagia dysarthria paresis  Oral intake ability:  full capability      T(C): 36.7 (10-24-23 @ 15:19), Max: 37.7 (10-23-23 @ 20:52)  HR: 91 (10-24-23 @ 15:19) (91 - 110)  BP: 131/84 (10-24-23 @ 15:19) (101/64 - 148/83)  RR: 18 (10-24-23 @ 15:19) (17 - 18)  SpO2: 97% (10-24-23 @ 15:19) (96% - 98%)  Wt(kg): --        LABS: REVIEWED  CBC:                        9.8    14.34 )-----------( 347      ( 24 Oct 2023 05:30 )             31.9     CMP:    10-24    136  |  99  |  29<H>  ----------------------------<  73  4.6   |  19<L>  |  1.39<H>    Ca    8.8      24 Oct 2023 16:43  Phos  3.5     10-24  Mg     2.4     10-24    TPro  7.2  /  Alb  2.6<L>  /  TBili  1.7<H>  /  DBili  x   /  AST  240<H>  /  ALT  38  /  AlkPhos  575<H>  10-24      RADIOLOGY & ADDITIONAL STUDIES:   - reviewed     DISCUSSION OF CASE:  - discussed analgesic regimen with pt and primary team    CARE COORDINATION:   PROGRESS NOTE  Date & Time of Note   2023-10-24 14:49    Notes    Notes: Chart reviewed and notes appreciated. Case discussed in IDR, as per the  medical team, patient is s/p biopsy pending CT chest and repeat labs for  creatinine levels with possible d/c home today 10/24/23. CM remains available.       Electronically signed by:  Rosalind Bueno-LINDA,RN  Electronically signed on:  2023-10-24  14:49

## 2023-10-24 NOTE — PRE-ANESTHESIA EVALUATION ADULT - NSANTHPMHFT_GEN_ALL_CORE
62yo M with no prior medical hx (no medical care over 30 years) admitted for 4 week hx of abdominal pain, found to have multiple liver lesions, elevated CA19, elevated LFT, Creatinine 1.4, and rectal mass here for sigmoidoscopy

## 2023-10-24 NOTE — DISCHARGE NOTE PROVIDER - NSDCCPCAREPLAN_GEN_ALL_CORE_FT
PRINCIPAL DISCHARGE DIAGNOSIS  Diagnosis: Rectal mass  Assessment and Plan of Treatment: You presented to the ED with abdominal pain. CT imaging of abdomen was concerning for multiple liver lesions, periportal lymphadenopathy, and rectal mass. IR was consulted and performed a liver biopsy. GI was consulted for colonoscopy. Gen surg was consulted and recommend tumor marker studies, which were elevated. Hemeonc consulted in patient and recommend outpatient follow up to review results and discuss management as likely concern for malignancy. Please follow up with hematology oncology outpatient.     PRINCIPAL DISCHARGE DIAGNOSIS  Diagnosis: Rectal mass  Assessment and Plan of Treatment: You presented to the ED with abdominal pain. CT imaging of abdomen was concerning for multiple liver lesions, periportal lymphadenopathy, and rectal mass. IR was consulted and performed a liver biopsy. GI was consulted for colonoscopy. Gen surg was consulted and recommend tumor marker studies, which were elevated. Hemeonc consulted in patient and recommend outpatient follow up to review results and discuss management as likely concern for malignancy. Your liver biopsy confirmed adenocarcinoma with colonic orgin. Please follow up with oncology outpatient and palliative outpatient.  _____________________  While in patient your creatnine was elevated, likely secondary to acute tubular necrosis which can occur after contrast from the imaging. Please continue to drink a lot of fluids.     PRINCIPAL DISCHARGE DIAGNOSIS  Diagnosis: Rectal mass  Assessment and Plan of Treatment: You presented to the ED with abdominal pain. CT imaging of abdomen was concerning for multiple liver lesions, periportal lymphadenopathy, and rectal mass. IR was consulted and performed a liver biopsy. GI was consulted for colonoscopy. Gen surg was consulted and recommend tumor marker studies, which were elevated. Hemeonc consulted in patient and recommend outpatient follow up to review results and discuss management as likely concern for malignancy. Your liver biopsy confirmed adenocarcinoma with colonic orgin. Please follow up with oncology outpatient and palliative outpatient.  _____________________  While in patient your creatnine was elevated, likely secondary to acute tubular necrosis which can occur after contrast from the imaging. Treatment involves continuing to hydrate and monitoring on repeat labs outpatient until it resolves. Please follow up with outpatient provider to ensure Cr is trending down.     PRINCIPAL DISCHARGE DIAGNOSIS  Diagnosis: Rectal mass  Assessment and Plan of Treatment: You presented to the ED with abdominal pain. CT imaging of abdomen was concerning for multiple liver lesions, periportal lymphadenopathy, and rectal mass. IR was consulted and performed a liver biopsy. GI was consulted for colonoscopy. Gen surg was consulted and recommend tumor marker studies, which were elevated. Hemeonc consulted in patient and recommend outpatient follow up to review results and discuss management as likely concern for malignancy. Your liver biopsy confirmed adenocarcinoma with colonic orgin. Please follow up with oncology outpatient and palliative outpatient.  _____________________  While in patient your creatnine was elevated, likely secondary to acute tubular necrosis which can occur after contrast from the imaging. While in patient, your Cr has continued to trend down. Treatment involves  hydration and monitoring on repeat labs outpatient until it resolves. Please follow up with outpatient provider to ensure Cr is trending down.

## 2023-10-24 NOTE — PROGRESS NOTE ADULT - PROBLEM SELECTOR PLAN 6
D: regular  E: replete Mg <2, Phosp <3, K <4  F: 1L NS bolus  DVT ppx: hold for IR procedure  Code: Full D: regular  E: replete Mg <2, Phosp <3, K <4  F: 1L NS bolus  DVT ppx: held for IR procedure  Code: Full

## 2023-10-24 NOTE — PROGRESS NOTE ADULT - SUBJECTIVE AND OBJECTIVE BOX
SUBJECTIVE: Patient seen and examined at bedside. Patient states that he has mild epigastric pain and endorses nausea with the golytely, but denies emesis.         Vital Signs Last 24 Hrs  T(C): 36.6 (24 Oct 2023 11:42), Max: 37.7 (23 Oct 2023 20:52)  T(F): 97.8 (24 Oct 2023 06:32), Max: 99.9 (23 Oct 2023 20:52)  HR: 110 (24 Oct 2023 11:42) (107 - 110)  BP: 101/64 (24 Oct 2023 11:42) (101/64 - 148/83)  BP(mean): 95 (24 Oct 2023 11:42) (95 - 95)  RR: 18 (24 Oct 2023 11:42) (17 - 18)  SpO2: 98% (24 Oct 2023 11:42) (96% - 98%)    Parameters below as of 24 Oct 2023 06:32  Patient On (Oxygen Delivery Method): room air      I&O's Detail      General: NAD, resting comfortably in bed  C/V: NSR  Pulm: Nonlabored breathing, no respiratory distress  Abd: firm and mild ttp in epigastrum, nondistended  Extrem: WWP, no edema, SCDs in place        LABS:                        9.8    14.34 )-----------( 347      ( 24 Oct 2023 05:30 )             31.9     10-24    133<L>  |  97  |  25<H>  ----------------------------<  89  4.8   |  21<L>  |  1.41<H>    Ca    9.1      24 Oct 2023 05:30  Phos  3.5     10-24  Mg     2.4     10-24    TPro  7.2  /  Alb  2.6<L>  /  TBili  1.7<H>  /  DBili  x   /  AST  240<H>  /  ALT  38  /  AlkPhos  575<H>  10-24    PT/INR - ( 23 Oct 2023 05:30 )   PT: 12.5 sec;   INR: 1.10          PTT - ( 23 Oct 2023 05:30 )  PTT:27.6 sec  Urinalysis Basic - ( 24 Oct 2023 05:30 )    Color: x / Appearance: x / SG: x / pH: x  Gluc: 89 mg/dL / Ketone: x  / Bili: x / Urobili: x   Blood: x / Protein: x / Nitrite: x   Leuk Esterase: x / RBC: x / WBC x   Sq Epi: x / Non Sq Epi: x / Bacteria: x        RADIOLOGY & ADDITIONAL STUDIES:

## 2023-10-24 NOTE — PROGRESS NOTE ADULT - PROBLEM SELECTOR PLAN 3
Pt with elevated alk phos 746 (elevated from 10/12 bloodwork) and , ALT 41. CT abd with liver lesions and small to moderate ascites. likely secondary to metastatic lesions vs infectious process. Hep panel neg  -PT 12.5 INR 1.10 PTT 27.6  - IR consult for liver biopsy Pt with elevated alk phos 575 (elevated from 10/12 bloodwork) and , ALT 38. CT abd with liver lesions and small to moderate ascites. likely secondary to metastatic lesions vs infectious process. Hep panel neg  -PT 12.5 INR 1.10 PTT 27.6  - IR consult for liver biopsy. f/u results Pt with elevated alk phos 575 (elevated from 10/12 bloodwork) and , ALT 38. CT abd with liver lesions and small to moderate ascites. likely secondary to metastatic lesions vs infectious process. Hep panel neg  -PT 12.5 INR 1.10 PTT 27.6  - IR performed liver biopsy 10/23

## 2023-10-24 NOTE — DISCHARGE NOTE PROVIDER - CARE PROVIDER_API CALL
Karina Duke  Phone: [phone]  Fax: [phone]  Scheduled Appointment: 11/09/2023 02:30 PM   Karina Duke  Phone: ([phone]  Fax: [phone]  Scheduled Appointment: 11/09/2023 02:30 PM    Ting Kumari  Palliative medicine  [address] [place] [id number]  Phone: [phone]  Fax: [phone]  Scheduled Appointment: 11/01/2023 02:00 PM

## 2023-10-24 NOTE — PROGRESS NOTE ADULT - PROBLEM SELECTOR PLAN 1
.  mixed somatic/visceral RUQ pain likely 2/2 mechanical stretch and tissue injury of liver iso diffuse liver lesions cf metastatic disease. Ineffective relief with prn tylenol at home.   - recommend ms 7.5 mg PO q4 PRN mild pain  - recommend  ms 15 mg PO q4 PRN moderate pain  - recommend ms 4 mg IVP q4 PRN severe pain   - Hold opioids for somnolence or RR<12   - recommend senna 2 tabs PO qHS standing, mLax in the morning   - Pending heme/onc recs for further workup of rectal mass.

## 2023-10-24 NOTE — PROGRESS NOTE ADULT - PROBLEM SELECTOR PLAN 1
CT abd and pelvis with rectal mass concerning for malignancy, periportal lymphadenopathy, and multiple liver lesions. Pt following with Dr. Julian and had elevated CEA and CA19 on outpatient labs. Concern for malignancy given lesions in liver. Hep panel negative. Elevated CEA, CA 19.9, , AFP wnl. s/p .2mg IV dilaudid  -IR for biopsy  -Per surgery, f/u CT chest and MRI pelvis  - f/u GI consult for colonoscopy   -f/u Palliative consult  -hemeonc consulted- recommend HIV CT abd and pelvis with rectal mass concerning for malignancy, periportal lymphadenopathy, and multiple liver lesions. Pt following with Dr. Julian and had elevated CEA and CA19 on outpatient labs. Concern for malignancy given lesions in liver. Hep panel negative. Elevated CEA, CA 19.9, , AFP wnl. s/p .2mg IV dilaudid. Surgery, palliative, heme/onc following  -Plan for outpatient heme/onc visit  -f/u colonoscopy    -f/u liver biopsy results   f/u CT chest   -f/u MRI pelvis outpatient  -Per palliative, symptomatic management CT abd and pelvis with rectal mass concerning for malignancy, periportal lymphadenopathy, and multiple liver lesions. Pt following with Dr. Julian and had elevated CEA and CA19 on outpatient labs. Concern for malignancy given lesions in liver. Hep panel negative. Elevated CEA, CA 19.9, , AFP wnl. s/p .2mg IV dilaudid. Surgery, palliative, heme/onc following. CT chest with multiple benign nodules.  -Plan for outpatient heme/onc visit  -f/u colonoscopy    -f/u liver biopsy results  -f/u MRI pelvis outpatient  -Per palliative, symptomatic management

## 2023-10-24 NOTE — PROGRESS NOTE ADULT - PROBLEM SELECTOR PLAN 2
.  prior to admission. since resolved. likely metabolic iso cancer.   - recommend zofran 4-8 mg PO q6 PRN nausea  - if qtc >500, recommend tigan 200 mg IM q8 PRN.

## 2023-10-25 DIAGNOSIS — D64.9 ANEMIA, UNSPECIFIED: ICD-10-CM

## 2023-10-25 LAB
ALBUMIN SERPL ELPH-MCNC: 2.5 G/DL — LOW (ref 3.3–5)
ALBUMIN SERPL ELPH-MCNC: 2.5 G/DL — LOW (ref 3.3–5)
ALP SERPL-CCNC: 568 U/L — HIGH (ref 40–120)
ALP SERPL-CCNC: 568 U/L — HIGH (ref 40–120)
ALT FLD-CCNC: 38 U/L — SIGNIFICANT CHANGE UP (ref 10–45)
ALT FLD-CCNC: 38 U/L — SIGNIFICANT CHANGE UP (ref 10–45)
ANION GAP SERPL CALC-SCNC: 13 MMOL/L — SIGNIFICANT CHANGE UP (ref 5–17)
ANION GAP SERPL CALC-SCNC: 13 MMOL/L — SIGNIFICANT CHANGE UP (ref 5–17)
ANION GAP SERPL CALC-SCNC: 15 MMOL/L — SIGNIFICANT CHANGE UP (ref 5–17)
ANION GAP SERPL CALC-SCNC: 15 MMOL/L — SIGNIFICANT CHANGE UP (ref 5–17)
AST SERPL-CCNC: 234 U/L — HIGH (ref 10–40)
AST SERPL-CCNC: 234 U/L — HIGH (ref 10–40)
BASOPHILS # BLD AUTO: 0.06 K/UL — SIGNIFICANT CHANGE UP (ref 0–0.2)
BASOPHILS # BLD AUTO: 0.06 K/UL — SIGNIFICANT CHANGE UP (ref 0–0.2)
BASOPHILS NFR BLD AUTO: 0.4 % — SIGNIFICANT CHANGE UP (ref 0–2)
BASOPHILS NFR BLD AUTO: 0.4 % — SIGNIFICANT CHANGE UP (ref 0–2)
BILIRUB SERPL-MCNC: 1.7 MG/DL — HIGH (ref 0.2–1.2)
BILIRUB SERPL-MCNC: 1.7 MG/DL — HIGH (ref 0.2–1.2)
BUN SERPL-MCNC: 30 MG/DL — HIGH (ref 7–23)
BUN SERPL-MCNC: 30 MG/DL — HIGH (ref 7–23)
BUN SERPL-MCNC: 31 MG/DL — HIGH (ref 7–23)
BUN SERPL-MCNC: 31 MG/DL — HIGH (ref 7–23)
CALCIUM SERPL-MCNC: 8.5 MG/DL — SIGNIFICANT CHANGE UP (ref 8.4–10.5)
CHLORIDE SERPL-SCNC: 96 MMOL/L — SIGNIFICANT CHANGE UP (ref 96–108)
CHLORIDE SERPL-SCNC: 96 MMOL/L — SIGNIFICANT CHANGE UP (ref 96–108)
CHLORIDE SERPL-SCNC: 99 MMOL/L — SIGNIFICANT CHANGE UP (ref 96–108)
CHLORIDE SERPL-SCNC: 99 MMOL/L — SIGNIFICANT CHANGE UP (ref 96–108)
CO2 SERPL-SCNC: 21 MMOL/L — LOW (ref 22–31)
CREAT ?TM UR-MCNC: 136 MG/DL — SIGNIFICANT CHANGE UP
CREAT ?TM UR-MCNC: 136 MG/DL — SIGNIFICANT CHANGE UP
CREAT SERPL-MCNC: 1.48 MG/DL — HIGH (ref 0.5–1.3)
EGFR: 53 ML/MIN/1.73M2 — LOW
EOSINOPHIL # BLD AUTO: 0.16 K/UL — SIGNIFICANT CHANGE UP (ref 0–0.5)
EOSINOPHIL # BLD AUTO: 0.16 K/UL — SIGNIFICANT CHANGE UP (ref 0–0.5)
EOSINOPHIL NFR BLD AUTO: 1.1 % — SIGNIFICANT CHANGE UP (ref 0–6)
EOSINOPHIL NFR BLD AUTO: 1.1 % — SIGNIFICANT CHANGE UP (ref 0–6)
FERRITIN SERPL-MCNC: 901 NG/ML — HIGH (ref 30–400)
FERRITIN SERPL-MCNC: 901 NG/ML — HIGH (ref 30–400)
GLUCOSE SERPL-MCNC: 132 MG/DL — HIGH (ref 70–99)
GLUCOSE SERPL-MCNC: 132 MG/DL — HIGH (ref 70–99)
GLUCOSE SERPL-MCNC: 163 MG/DL — HIGH (ref 70–99)
GLUCOSE SERPL-MCNC: 163 MG/DL — HIGH (ref 70–99)
HCT VFR BLD CALC: 27.6 % — LOW (ref 39–50)
HCT VFR BLD CALC: 27.6 % — LOW (ref 39–50)
HCT VFR BLD CALC: 28 % — LOW (ref 39–50)
HCT VFR BLD CALC: 28 % — LOW (ref 39–50)
HGB BLD-MCNC: 8.8 G/DL — LOW (ref 13–17)
IMM GRANULOCYTES NFR BLD AUTO: 0.9 % — SIGNIFICANT CHANGE UP (ref 0–0.9)
IMM GRANULOCYTES NFR BLD AUTO: 0.9 % — SIGNIFICANT CHANGE UP (ref 0–0.9)
IRON SATN MFR SERPL: 14 % — LOW (ref 16–55)
IRON SATN MFR SERPL: 14 % — LOW (ref 16–55)
IRON SATN MFR SERPL: 14 UG/DL — LOW (ref 45–165)
IRON SATN MFR SERPL: 14 UG/DL — LOW (ref 45–165)
LYMPHOCYTES # BLD AUTO: 1.2 K/UL — SIGNIFICANT CHANGE UP (ref 1–3.3)
LYMPHOCYTES # BLD AUTO: 1.2 K/UL — SIGNIFICANT CHANGE UP (ref 1–3.3)
LYMPHOCYTES # BLD AUTO: 8.2 % — LOW (ref 13–44)
LYMPHOCYTES # BLD AUTO: 8.2 % — LOW (ref 13–44)
MAGNESIUM SERPL-MCNC: 2.2 MG/DL — SIGNIFICANT CHANGE UP (ref 1.6–2.6)
MAGNESIUM SERPL-MCNC: 2.2 MG/DL — SIGNIFICANT CHANGE UP (ref 1.6–2.6)
MCHC RBC-ENTMCNC: 24.9 PG — LOW (ref 27–34)
MCHC RBC-ENTMCNC: 24.9 PG — LOW (ref 27–34)
MCHC RBC-ENTMCNC: 25.4 PG — LOW (ref 27–34)
MCHC RBC-ENTMCNC: 25.4 PG — LOW (ref 27–34)
MCHC RBC-ENTMCNC: 31.4 GM/DL — LOW (ref 32–36)
MCHC RBC-ENTMCNC: 31.4 GM/DL — LOW (ref 32–36)
MCHC RBC-ENTMCNC: 31.9 GM/DL — LOW (ref 32–36)
MCHC RBC-ENTMCNC: 31.9 GM/DL — LOW (ref 32–36)
MCV RBC AUTO: 79.1 FL — LOW (ref 80–100)
MCV RBC AUTO: 79.1 FL — LOW (ref 80–100)
MCV RBC AUTO: 79.5 FL — LOW (ref 80–100)
MCV RBC AUTO: 79.5 FL — LOW (ref 80–100)
MONOCYTES # BLD AUTO: 1.68 K/UL — HIGH (ref 0–0.9)
MONOCYTES # BLD AUTO: 1.68 K/UL — HIGH (ref 0–0.9)
MONOCYTES NFR BLD AUTO: 11.5 % — SIGNIFICANT CHANGE UP (ref 2–14)
MONOCYTES NFR BLD AUTO: 11.5 % — SIGNIFICANT CHANGE UP (ref 2–14)
NEUTROPHILS # BLD AUTO: 11.41 K/UL — HIGH (ref 1.8–7.4)
NEUTROPHILS # BLD AUTO: 11.41 K/UL — HIGH (ref 1.8–7.4)
NEUTROPHILS NFR BLD AUTO: 77.9 % — HIGH (ref 43–77)
NEUTROPHILS NFR BLD AUTO: 77.9 % — HIGH (ref 43–77)
NON-GYNECOLOGICAL CYTOLOGY STUDY: SIGNIFICANT CHANGE UP
NON-GYNECOLOGICAL CYTOLOGY STUDY: SIGNIFICANT CHANGE UP
NRBC # BLD: 0 /100 WBCS — SIGNIFICANT CHANGE UP (ref 0–0)
PHOSPHATE SERPL-MCNC: 1.9 MG/DL — LOW (ref 2.5–4.5)
PHOSPHATE SERPL-MCNC: 1.9 MG/DL — LOW (ref 2.5–4.5)
PLATELET # BLD AUTO: 250 K/UL — SIGNIFICANT CHANGE UP (ref 150–400)
PLATELET # BLD AUTO: 250 K/UL — SIGNIFICANT CHANGE UP (ref 150–400)
PLATELET # BLD AUTO: 251 K/UL — SIGNIFICANT CHANGE UP (ref 150–400)
PLATELET # BLD AUTO: 251 K/UL — SIGNIFICANT CHANGE UP (ref 150–400)
POTASSIUM SERPL-MCNC: 4.3 MMOL/L — SIGNIFICANT CHANGE UP (ref 3.5–5.3)
POTASSIUM SERPL-SCNC: 4.3 MMOL/L — SIGNIFICANT CHANGE UP (ref 3.5–5.3)
PROT ?TM UR-MCNC: 67 MG/DL — HIGH (ref 0–12)
PROT ?TM UR-MCNC: 67 MG/DL — HIGH (ref 0–12)
PROT SERPL-MCNC: 6.6 G/DL — SIGNIFICANT CHANGE UP (ref 6–8.3)
PROT SERPL-MCNC: 6.6 G/DL — SIGNIFICANT CHANGE UP (ref 6–8.3)
PROT/CREAT UR-RTO: 0.5 RATIO — HIGH (ref 0–0.2)
PROT/CREAT UR-RTO: 0.5 RATIO — HIGH (ref 0–0.2)
RBC # BLD: 3.47 M/UL — LOW (ref 4.2–5.8)
RBC # BLD: 3.47 M/UL — LOW (ref 4.2–5.8)
RBC # BLD: 3.54 M/UL — LOW (ref 4.2–5.8)
RBC # BLD: 3.54 M/UL — LOW (ref 4.2–5.8)
RBC # FLD: 17.3 % — HIGH (ref 10.3–14.5)
RBC # FLD: 17.3 % — HIGH (ref 10.3–14.5)
RBC # FLD: 17.8 % — HIGH (ref 10.3–14.5)
RBC # FLD: 17.8 % — HIGH (ref 10.3–14.5)
SODIUM SERPL-SCNC: 130 MMOL/L — LOW (ref 135–145)
SODIUM SERPL-SCNC: 130 MMOL/L — LOW (ref 135–145)
SODIUM SERPL-SCNC: 135 MMOL/L — SIGNIFICANT CHANGE UP (ref 135–145)
SODIUM SERPL-SCNC: 135 MMOL/L — SIGNIFICANT CHANGE UP (ref 135–145)
SODIUM UR-SCNC: <20 MMOL/L — SIGNIFICANT CHANGE UP
SODIUM UR-SCNC: <20 MMOL/L — SIGNIFICANT CHANGE UP
TIBC SERPL-MCNC: 99 UG/DL — LOW (ref 220–430)
TIBC SERPL-MCNC: 99 UG/DL — LOW (ref 220–430)
UIBC SERPL-MCNC: 85 UG/DL — LOW (ref 110–370)
UIBC SERPL-MCNC: 85 UG/DL — LOW (ref 110–370)
WBC # BLD: 14.58 K/UL — HIGH (ref 3.8–10.5)
WBC # BLD: 14.58 K/UL — HIGH (ref 3.8–10.5)
WBC # BLD: 14.64 K/UL — HIGH (ref 3.8–10.5)
WBC # BLD: 14.64 K/UL — HIGH (ref 3.8–10.5)
WBC # FLD AUTO: 14.58 K/UL — HIGH (ref 3.8–10.5)
WBC # FLD AUTO: 14.58 K/UL — HIGH (ref 3.8–10.5)
WBC # FLD AUTO: 14.64 K/UL — HIGH (ref 3.8–10.5)
WBC # FLD AUTO: 14.64 K/UL — HIGH (ref 3.8–10.5)

## 2023-10-25 PROCEDURE — 99233 SBSQ HOSP IP/OBS HIGH 50: CPT

## 2023-10-25 PROCEDURE — 99233 SBSQ HOSP IP/OBS HIGH 50: CPT | Mod: GC

## 2023-10-25 RX ORDER — HYDROMORPHONE HYDROCHLORIDE 2 MG/ML
1 INJECTION INTRAMUSCULAR; INTRAVENOUS; SUBCUTANEOUS
Qty: 20 | Refills: 0
Start: 2023-10-25

## 2023-10-25 RX ORDER — HYDROMORPHONE HYDROCHLORIDE 2 MG/ML
2 INJECTION INTRAMUSCULAR; INTRAVENOUS; SUBCUTANEOUS EVERY 4 HOURS
Refills: 0 | Status: DISCONTINUED | OUTPATIENT
Start: 2023-10-25 | End: 2023-10-27

## 2023-10-25 RX ORDER — HYDROMORPHONE HYDROCHLORIDE 2 MG/ML
4 INJECTION INTRAMUSCULAR; INTRAVENOUS; SUBCUTANEOUS EVERY 4 HOURS
Refills: 0 | Status: DISCONTINUED | OUTPATIENT
Start: 2023-10-25 | End: 2023-10-27

## 2023-10-25 RX ORDER — NALOXONE HYDROCHLORIDE 4 MG/.1ML
4 SPRAY NASAL
Qty: 1 | Refills: 0
Start: 2023-10-25

## 2023-10-25 RX ADMIN — Medication 100 MILLIGRAM(S): at 11:55

## 2023-10-25 RX ADMIN — HYDROMORPHONE HYDROCHLORIDE 2 MILLIGRAM(S): 2 INJECTION INTRAMUSCULAR; INTRAVENOUS; SUBCUTANEOUS at 11:55

## 2023-10-25 RX ADMIN — HYDROMORPHONE HYDROCHLORIDE 2 MILLIGRAM(S): 2 INJECTION INTRAMUSCULAR; INTRAVENOUS; SUBCUTANEOUS at 12:55

## 2023-10-25 RX ADMIN — MORPHINE SULFATE 4 MILLIGRAM(S): 50 CAPSULE, EXTENDED RELEASE ORAL at 00:08

## 2023-10-25 RX ADMIN — HYDROMORPHONE HYDROCHLORIDE 2 MILLIGRAM(S): 2 INJECTION INTRAMUSCULAR; INTRAVENOUS; SUBCUTANEOUS at 19:02

## 2023-10-25 RX ADMIN — HYDROMORPHONE HYDROCHLORIDE 2 MILLIGRAM(S): 2 INJECTION INTRAMUSCULAR; INTRAVENOUS; SUBCUTANEOUS at 20:02

## 2023-10-25 RX ADMIN — Medication 1 TABLET(S): at 11:55

## 2023-10-25 NOTE — PROGRESS NOTE ADULT - PROBLEM SELECTOR PLAN 5
Pt with elevated alk phos 575 (elevated from 10/12 bloodwork) and , ALT 38. CT abd with liver lesions and small to moderate ascites. likely secondary to metastatic lesions vs infectious process. Hep panel neg  -PT 12.5 INR 1.10 PTT 27.6  - IR performed liver biopsy 10/23- adenocarcinoma with necrosis

## 2023-10-25 NOTE — PROGRESS NOTE ADULT - PROBLEM SELECTOR PLAN 4
Hgb 11.7 on admission. Unkown baseline. Normocytic. Hgb dropped to 8.8. Denies melena or hematochezia. anemia in setting of GI bleed given colon cancer vs dilutional following fluids given 10/24  - repeat 2pm CBC

## 2023-10-25 NOTE — PROGRESS NOTE ADULT - SUBJECTIVE AND OBJECTIVE BOX
SUBJECTIVE: Patient seen and examined at bedside. Patient states that his pain has been well controlled, he endorses mild nausea without emesis. His last bowel movement was yesterday while receiving the golytely.    Vital Signs Last 24 Hrs  T(C): 37.7 (25 Oct 2023 06:10), Max: 37.7 (25 Oct 2023 06:10)  T(F): 99.8 (25 Oct 2023 06:10), Max: 99.8 (25 Oct 2023 06:10)  HR: 111 (25 Oct 2023 06:10) (91 - 111)  BP: 130/79 (25 Oct 2023 06:10) (125/74 - 131/84)  BP(mean): 100 (24 Oct 2023 15:19) (100 - 100)  RR: 17 (25 Oct 2023 06:10) (17 - 18)  SpO2: 96% (25 Oct 2023 06:10) (96% - 99%)    Parameters below as of 25 Oct 2023 06:10  Patient On (Oxygen Delivery Method): room air      I&O's Detail      General: NAD, resting comfortably in bed  C/V: NSR  Pulm: Nonlabored breathing, no respiratory distress  Abd: firm and mild ttp in epigastrum, nondistended  Extrem: WWP, no edema, SCDs in place        LABS:                        8.8    14.64 )-----------( 251      ( 25 Oct 2023 05:30 )             28.0     10-25    135  |  99  |  30<H>  ----------------------------<  132<H>  4.3   |  21<L>  |  1.48<H>    Ca    8.5      25 Oct 2023 05:30  Phos  1.9     10-25  Mg     2.2     10-25    TPro  6.6  /  Alb  2.5<L>  /  TBili  1.7<H>  /  DBili  x   /  AST  234<H>  /  ALT  38  /  AlkPhos  568<H>  10-25      Urinalysis Basic - ( 25 Oct 2023 05:30 )    Color: x / Appearance: x / SG: x / pH: x  Gluc: 132 mg/dL / Ketone: x  / Bili: x / Urobili: x   Blood: x / Protein: x / Nitrite: x   Leuk Esterase: x / RBC: x / WBC x   Sq Epi: x / Non Sq Epi: x / Bacteria: x        RADIOLOGY & ADDITIONAL STUDIES:

## 2023-10-25 NOTE — PROGRESS NOTE ADULT - ASSESSMENT
63M with no PMH (had not seen a doctor), presenting with 4 weeks of abdominal pain. Saw Dr. Julian out-patient for CT abdomen/pelvis, which showed liver masses and possible rectal mass. CEA elevated to 9094 and CA 19-9 elevated to 5930.    CT Abdomen and Pelvis w/ Oral Cont and w/ IV Cont:   1. Innumerable masses in enlarged liver, consistent with extensive   hepatic metastatic disease  2. There is a low rectal mass that is suspicious for rectal cancer.   Recommend further evaluation with colonoscopy.  3. Small to moderate ascites.  4. Small right pleural effusion.  5. Moderate periportal lymphadenopathy    Findings:  - A rectal mass was noted. Mass started at the anal verge and was 3.5 cm in length. It was non-obstructing, measuring approximately 5-7 cm in width.   - It was highly concerning for malignancy and was very friable. Multiple cold forceps biopsies were performed for histology in the rectal mass. These biopsies were done evaluate for malignancy in setting of known liver masses.	  - Luminal narrowing and tight angulations, possibly due to mass effect from lymphadenopathy.    Recommendations:   - Follow-up pathology results from colonoscopy rectal mass   - IR liver biopsy showing adenocarcinoma   - Follow up imaging for staging and medical oncology recommendations  - Spoke with patient about the importance of him seeing heme onc out-patient Dr. Khan     Case discussed with Dr. Dong. GI team will sign off.     Tali Alatorre D.O.   Gastroenterology Fellow  Weekday 7am-5pm Pager: [phone]  Weeknights/Weekend/Holiday Coverage: Please call the  for contact info.

## 2023-10-25 NOTE — PROGRESS NOTE ADULT - PROBLEM SELECTOR PLAN 6
, WBC 16.4, afebrile, no cough, no likely source of infection. UA few bacteria. Likely reactive to malignancy. WBC downtrending 14.64

## 2023-10-25 NOTE — PROGRESS NOTE ADULT - PROBLEM SELECTOR PLAN 1
CT abd and pelvis with rectal mass concerning for malignancy, periportal lymphadenopathy, and multiple liver lesions. Pt following with Dr. Julian and had elevated CEA and CA19 on outpatient labs. Concern for malignancy given lesions in liver. Hep panel negative. Elevated CEA, CA 19.9, , AFP wnl. s/p .2mg IV dilaudid. Surgery, palliative, heme/onc following. CT chest with multiple benign nodules. Liver biopsy: adenocarcinoma likely colonic origin  -Plan for outpatient heme/onc visit  -f/u colonoscopy    -f/u MRI pelvis outpatient  -Per palliative, symptomatic management CT abd and pelvis with rectal mass concerning for malignancy, periportal lymphadenopathy, and multiple liver lesions. Pt following with Dr. Julian and had elevated CEA and CA19 on outpatient labs. Concern for malignancy given lesions in liver. Hep panel negative. Elevated CEA, CA 19.9, , AFP wnl. s/p .2mg IV dilaudid. Surgery, palliative, heme/onc following. CT chest with multiple benign nodules. Liver biopsy: adenocarcinoma likely colonic origin  -Plan for outpatient heme/onc visit  -f/u colonoscopy    -f/u MRI pelvis outpatient  -Per palliative, symptomatic management for pain: dilaudid po 2mg prn and dilaudid po 4mg prn

## 2023-10-25 NOTE — PROGRESS NOTE ADULT - PROBLEM SELECTOR PLAN 1
.  mixed somatic/visceral RUQ pain likely 2/2 mechanical stretch and tissue injury of liver iso diffuse liver lesions cf metastatic disease. Ineffective relief with prn tylenol at home. Now with kidney dysfunction  - dc MS  - ordered dil 2mg-4mg POq4 PRN moderate/severe pain  - give dil 0.5 mg IVP prn severe pain crisis or pain refractory to PO  - Hold opioids for somnolence or RR<12   - recommend senna 2 tabs PO qHS standing, mLax in the morning   - heme onc cs/outpatient fu

## 2023-10-25 NOTE — PROGRESS NOTE ADULT - PROBLEM SELECTOR PLAN 3
.  pps 50%, requires assistance with some ADLs. lives alone with limited social support to assist with prison care.   - cw supportive care, skin care, encourage OOB  - PT cs  - Anticipate pt will need HHA support in future if pt choses to pursue DMTx here in [place]

## 2023-10-25 NOTE — PROGRESS NOTE ADULT - ATTENDING COMMENTS
Metastatic rectal cancer w/ mets to the liver.  Biopsy of rectal mass pending, as are immunostains for liver lesion.  Pt's pain well controlled post procedure.  Recommend outpatient f/u w/ medical oncology and pall care.  GI will sign off. Please call us back as needed.

## 2023-10-25 NOTE — PROGRESS NOTE ADULT - PROBLEM SELECTOR PLAN 6
.  Complex medical decision making / symptom management in the setting of advanced illness.    GOC and symptom management recs as above  Given frailty, legitimate concern for pts poor long term prognosis even if DMTx are offered/pursued    Coping:  well[ x ] with difficulty[  ] poor coping[  ] unable to assess[  ]   Support system: strong[  ] adequate[ x ] inadequate[ x ]    Active Psychosocial Referrals:  SW/CM[ x ] PT/OT[ x ] Hospice[  ]  [place][  ]  [facility] Patient/Family Support[  ] Chaplaincy[   ]  Massage Therapy[ x ] Music Therapy [x  ] Holistic RN[  ]    Active Outpatient Palliative Care Referrals:  Supportive Oncology Clinic [x ]    - For new or uncontrolled symptoms, please call Palliative Care at [phone]. The service is available 24/7 (including nights & weekends) to provide symptom management recommendations over the phone as appropriate.

## 2023-10-25 NOTE — PROGRESS NOTE ADULT - PROBLEM SELECTOR PLAN 3
Cr 1.28-->1.48, (Labs from 10/12 1.09). UA with casts. possibly in setting of poor PO intake vs contrast induced ATN.  bladder scan 10/23: retaining 148 cc. CT chest imaging: Striated nephrograms are present in the kidneys bilaterally, which may be a sign of acute tubular necrosis.   - f/u 2PM Cr Cr 1.28-->1.48, (Labs from 10/12 1.09). UA with casts. possibly in setting of poor PO intake vs contrast induced ATN.  bladder scan 10/23: retaining 148 cc. CT chest imaging: Striated nephrograms are present in the kidneys bilaterally, which may be a sign of acute tubular necrosis.   - f/u 2PM Cr  - f/u urine lytes

## 2023-10-25 NOTE — PROGRESS NOTE ADULT - ASSESSMENT
Pt is 62 y/o male with no pmh presented to ED w/ abdominal pain for 4 weeks. Pt has lost 20 lbs in past 8 weeks and has complained of "pencil like" stools. CT ab/pelv on arrival revealed low rectal mass and hepatomegaly w/ innumerable masses c/w extensive mets. Liver biopsy confirmed adenocarcinoma with colonic origin.

## 2023-10-25 NOTE — PROGRESS NOTE ADULT - PROBLEM SELECTOR PLAN 4
.  CT abd and pelvis with rectal mass concerning for malignancy, periportal lymphadenopathy, and multiple liver lesions. Ecog 2/3, frail. sp colonoscopy with bx 10/24, path pending  -  heme onc cs/outpatient fu  - If no further disease modifying treatments offered or patient/family declined further disease modifying treatments,  patient would qualify for hospice.

## 2023-10-25 NOTE — PROGRESS NOTE ADULT - PROBLEM SELECTOR PLAN 2
4 weeks of R sided abd pain and early satiety. Metastatic adenocarcinoma with colonic origin   - GI consult for colonoscopy 10/24 4 weeks of R sided abd pain and early satiety. Liver biopsy confirmed metastatic adenocarcinoma with colonic origin   - GI: colonoscopy 10/24

## 2023-10-25 NOTE — PROGRESS NOTE ADULT - ASSESSMENT
62 yo M with no pmh who pw abdominal pain and unintentional weight loss for 4 weeks.  CT ab/pelv on arrival revealed low rectal mass and hepatomegaly w/ innumerable masses c/w extensive mets. sp colonoscopy, sp bx, with path pending. Palliative consulted for complex medical decision making / symptom management in the setting of advanced illness.      - On discharge, patient should follow-up outpatient with palliative care provider, Dr. Kumari at  [address]  - Please email [email or web address] with patient's name, MRN and Dr. Kumari's name to request an outpatient appointment and call #[phone] to schedule appointment

## 2023-10-25 NOTE — PROGRESS NOTE ADULT - PROBLEM SELECTOR PLAN 7
D: regular  E: replete Mg <2, Phosp <3, K <4  F: 1L NS bolus  DVT ppx: held given drop in Hgb- reevaluate after 2pm CBC  Code: Full

## 2023-10-25 NOTE — PROGRESS NOTE ADULT - PROBLEM SELECTOR PLAN 5
.  - [facility] 10/23 and above   - HCP completed naming sister Irma Barbour [phone]  - Full code

## 2023-10-25 NOTE — PROGRESS NOTE ADULT - SUBJECTIVE AND OBJECTIVE BOX
SUBJECTIVE/OBJECTIVE: Interval events noted. dc canceled dt kidney dysfunction. this morning pt c/o of acute on chronic RUQ pain. in last 24hs pt received ms 4 mg IVP x2 for management of acute pain, which was effective. No unexpected adverse effects of opiates noted: no sedation/dizziness/nausea/myoclonus. Comprehensive symptom assessment as noted below. Extensive time spent discussing plan of care with patient. Pt would like to continue having palliative support upon discharge.     ALLERGIES: No Known Allergies      MEDICATIONS: REVIEWED  MEDICATIONS  (STANDING):  dextrose 5% + sodium chloride 0.45%. 1000 milliLiter(s) (75 mL/Hr) IV Continuous <Continuous>  influenza   Vaccine 0.5 milliLiter(s) IntraMuscular once  multivitamin 1 Tablet(s) Oral daily  thiamine 100 milliGRAM(s) Oral daily    MEDICATIONS  (PRN):  acetaminophen     Tablet .. 650 milliGRAM(s) Oral every 6 hours PRN Temp greater or equal to 38C (100.4F), Mild Pain (1 - 3)  aluminum hydroxide/magnesium hydroxide/simethicone Suspension 30 milliLiter(s) Oral every 4 hours PRN Dyspepsia  HYDROmorphone   Tablet 2 milliGRAM(s) Oral every 4 hours PRN Moderate Pain (4 - 6)  HYDROmorphone   Tablet 4 milliGRAM(s) Oral every 4 hours PRN Severe Pain (7 - 10)  melatonin 3 milliGRAM(s) Oral at bedtime PRN Insomnia  ondansetron Injectable 4 milliGRAM(s) IV Push every 8 hours PRN Nausea and/or Vomiting    PRESENT SYMPTOMS:   [ ]Unable to obtain due to poor mentation   Source if other than patient:  [ ]Family   [ ]Team     Pain [ x ]  Some interference with ADLs   Location :      RUQ  Quality:  deep ache, pushing   Radiation: diffuse Right side   Timing: constant with BTP  Aggravating factors: lying on R side, deep palpation   Minimal acceptable level (0-10 scale): 2/10  Severity in last 24h (0-10 scale) : 8/10  Current score (0-10 scale): 4/10  Improves with:  opioids    PAIN AD Score:  0  [email or web address] (press ctrl +  left click to view)    Analgesic Use (Scheduled and PRNs) for past 24 hours (6a-6a):  - MS 4 mg IVP q4 PRN x2     If [  ], pt denies symptom.   Dyspnea:         [  ]  Anxiety:           [  ]  Difficulty sleeping: [  ]  Fatigue:           [ x ] moderate  Nausea:           [  x] intermittent prior to admission, has been without over last 24hs   Loss of appetite:     [  ] good/fair appetite, severe early satiety   Dysphagia: [  ]  Constipation:   [  ]        LBM 10/24  Other Symptoms:    All other review of systems negative [ x ]    ECOG Performance:     2  Current Palliative Performance Scale/Karnofsky Score:   50 %  Preadmit Karnofsky:  60/70 %          PEx:  General: cachectic man sitting up in bed, NAD  alert  oriented x  3   verbal   Behavioral: Calm pleasant   HEENT: atraumatic,  + temporal wasting,  No dry mouth  RESP: Reg rhythm, No  tachypnea/labored breathing,  No audible excessive secretions,  CTAB, diminished bilat bases  CV: RRR, S1S2,  +tachycardia  GI: flat, soft lower quadrants, semi firm RUQ, mild tenderness to palpation, hypoactive bs   MUSK: weakness x4,No  edema, ambulatory   SKIN: No abnormal skin lesions, Poor skin turgor, mild jaundice   NEURO:  No deficits, cognitive impairment, encephalopathic dsyphagia dysarthria paresis  Oral intake ability:  full capability    T(C): 36.7 (10-25-23 @ 11:57), Max: 37.7 (10-25-23 @ 06:10)  HR: 99 (10-25-23 @ 11:57) (91 - 111)  BP: 122/79 (10-25-23 @ 11:57) (122/79 - 131/84)  RR: 18 (10-25-23 @ 11:57) (17 - 18)  SpO2: 98% (10-25-23 @ 11:57) (96% - 99%)  Wt(kg): --    LABS: REVIEWED  CBC:                        8.8    14.64 )-----------( 251      ( 25 Oct 2023 05:30 )             28.0     CMP:    10-25    135  |  99  |  30<H>  ----------------------------<  132<H>  4.3   |  21<L>  |  1.48<H>    Ca    8.5      25 Oct 2023 05:30  Phos  1.9     10-25  Mg     2.2     10-25    TPro  6.6  /  Alb  2.5<L>  /  TBili  1.7<H>  /  DBili  x   /  AST  234<H>  /  ALT  38  /  AlkPhos  568<H>  10-25      RADIOLOGY & ADDITIONAL STUDIES:   - Patient is s/p colonoscopy in endoscopy unit for rectal mass and liver masses noted on CT.    Findings:  - A rectal mass was noted. Mass started at the anal verge and was 3.5 cm in length. It was non-obstructing, measuring approximately 5-7 cm in width.   - It was highly concerning for malignancy and was very friable. Multiple cold forceps biopsies were performed for histology in the rectal mass. These biopsies were done evaluate for malignancy in setting of known liver masses.	  - Luminal narrowing and tight angulations, possibly due to mass effect from lymphadenopathy.    Recommendations:   - Advance diet as tolerated  - Await pathology results.  - Return to floor for further management  - Follow-up results of liver biopsy  - Follow up imaging for staging and medical oncology recommendations    Case discussed with Dr. Dong. GI team will continue to follow.     DISCUSSION OF CASE:  - discussed plan of care and outpatient pall fu with pt, primary team updated       SUBJECTIVE/OBJECTIVE: Interval events noted. dc canceled dt kidney dysfunction. this morning pt c/o of acute on chronic RUQ pain. in last 24hs pt received ms 4 mg IVP x2 for management of acute pain, which was effective. No unexpected adverse effects of opiates noted: no sedation/dizziness/nausea/myoclonus. Comprehensive symptom assessment as noted below. Extensive time spent discussing plan of care with patient. Pt would like to continue having palliative support upon discharge.     ALLERGIES: No Known Allergies    MEDICATIONS: REVIEWED  MEDICATIONS  (STANDING):  dextrose 5% + sodium chloride 0.45%. 1000 milliLiter(s) (75 mL/Hr) IV Continuous <Continuous>  influenza   Vaccine 0.5 milliLiter(s) IntraMuscular once  multivitamin 1 Tablet(s) Oral daily  thiamine 100 milliGRAM(s) Oral daily    MEDICATIONS  (PRN):  acetaminophen     Tablet .. 650 milliGRAM(s) Oral every 6 hours PRN Temp greater or equal to 38C (100.4F), Mild Pain (1 - 3)  aluminum hydroxide/magnesium hydroxide/simethicone Suspension 30 milliLiter(s) Oral every 4 hours PRN Dyspepsia  HYDROmorphone   Tablet 2 milliGRAM(s) Oral every 4 hours PRN Moderate Pain (4 - 6)  HYDROmorphone   Tablet 4 milliGRAM(s) Oral every 4 hours PRN Severe Pain (7 - 10)  melatonin 3 milliGRAM(s) Oral at bedtime PRN Insomnia  ondansetron Injectable 4 milliGRAM(s) IV Push every 8 hours PRN Nausea and/or Vomiting    PRESENT SYMPTOMS:   [ ]Unable to obtain due to poor mentation   Source if other than patient:  [ ]Family   [ ]Team     Pain [ x ]  Some interference with ADLs   Location :      RUQ  Quality:  deep ache, pushing   Radiation: diffuse Right side   Timing: constant with BTP  Aggravating factors: lying on R side, deep palpation   Minimal acceptable level (0-10 scale): 2/10  Severity in last 24h (0-10 scale) : 8/10  Current score (0-10 scale): 4/10  Improves with:  opioids    PAIN AD Score:  0  [email or web address] (press ctrl +  left click to view)    Analgesic Use (Scheduled and PRNs) for past 24 hours (6a-6a):  - MS 4 mg IVP q4 PRN x2     If [  ], pt denies symptom.   Dyspnea:         [  ]  Anxiety:           [  ]  Difficulty sleeping: [  ]  Fatigue:           [ x ] moderate  Nausea:           [  x] intermittent prior to admission, has been without over last 24hs   Loss of appetite:     [  ] good/fair appetite, severe early satiety   Dysphagia: [  ]  Constipation:   [  ]        LBM 10/24  Other Symptoms:    All other review of systems negative [ x ]    ECOG Performance:     2  Current Palliative Performance Scale/Karnofsky Score:   50 %  Preadmit Karnofsky:  60/70 %          PEx:  General: cachectic man sitting up in bed, NAD  alert  oriented x  3   verbal   Behavioral: Calm pleasant   HEENT: atraumatic,  + temporal wasting,  No dry mouth  RESP: Reg rhythm, No  tachypnea/labored breathing,  No audible excessive secretions,  CTAB, diminished bilat bases  CV: RRR, S1S2,  +tachycardia  GI: flat, soft lower quadrants, semi firm RUQ, mild tenderness to palpation, hypoactive bs   MUSK: weakness x4,No  edema, ambulatory   SKIN: No abnormal skin lesions, Poor skin turgor, mild jaundice   NEURO:  No deficits, cognitive impairment, encephalopathic dsyphagia dysarthria paresis  Oral intake ability:  full capability    T(C): 36.7 (10-25-23 @ 11:57), Max: 37.7 (10-25-23 @ 06:10)  HR: 99 (10-25-23 @ 11:57) (91 - 111)  BP: 122/79 (10-25-23 @ 11:57) (122/79 - 131/84)  RR: 18 (10-25-23 @ 11:57) (17 - 18)  SpO2: 98% (10-25-23 @ 11:57) (96% - 99%)  Wt(kg): --    LABS: REVIEWED  CBC:                        8.8    14.64 )-----------( 251      ( 25 Oct 2023 05:30 )             28.0     CMP:    10-25    135  |  99  |  30<H>  ----------------------------<  132<H>  4.3   |  21<L>  |  1.48<H>    Ca    8.5      25 Oct 2023 05:30  Phos  1.9     10-25  Mg     2.2     10-25    TPro  6.6  /  Alb  2.5<L>  /  TBili  1.7<H>  /  DBili  x   /  AST  234<H>  /  ALT  38  /  AlkPhos  568<H>  10-25      RADIOLOGY & ADDITIONAL STUDIES:   - Patient is s/p colonoscopy in endoscopy unit for rectal mass and liver masses noted on CT.    Findings:  - A rectal mass was noted. Mass started at the anal verge and was 3.5 cm in length. It was non-obstructing, measuring approximately 5-7 cm in width.   - It was highly concerning for malignancy and was very friable. Multiple cold forceps biopsies were performed for histology in the rectal mass. These biopsies were done evaluate for malignancy in setting of known liver masses.	  - Luminal narrowing and tight angulations, possibly due to mass effect from lymphadenopathy.    Recommendations:   - Advance diet as tolerated  - Await pathology results.  - Return to floor for further management  - Follow-up results of liver biopsy  - Follow up imaging for staging and medical oncology recommendations    Case discussed with Dr. Dong. GI team will continue to follow.     DISCUSSION OF CASE:  - discussed plan of care and outpatient pall fu with pt, primary team updated

## 2023-10-25 NOTE — PROGRESS NOTE ADULT - SUBJECTIVE AND OBJECTIVE BOX
GI Consult Progress Note:       OVERNIGHT EVENTS: [place]RIANA.    SUBJECTIVE / INTERVAL HPI:   Patient seen and examined at bedside. Overall reports that he feels well and the abdominal pain he experienced post colonoscopy has now resolved. Emphasized importance of following up with heme onc (Dr. Ware) after discharge from the hospital.         VITAL SIGNS:  Vital Signs Last 24 Hrs  T(C): 36.7 (25 Oct 2023 11:57), Max: 37.7 (25 Oct 2023 06:10)  T(F): 98.1 (25 Oct 2023 11:57), Max: 99.8 (25 Oct 2023 06:10)  HR: 99 (25 Oct 2023 11:57) (99 - 111)  BP: 122/79 (25 Oct 2023 11:57) (122/79 - 130/79)  BP(mean): --  RR: 18 (25 Oct 2023 11:57) (17 - 18)  SpO2: 98% (25 Oct 2023 11:57) (96% - 99%)    Parameters below as of 25 Oct 2023 11:57  Patient On (Oxygen Delivery Method): room air          PHYSICAL EXAM:  General: No acute distress, thin, cachectic   Lungs: Normal respiratory effort and no intercostal retractions  Cardiovascular: RRR  Abdomen: Minimally tender, non-distended, palpable liver   Neurological: Alert and oriented x3  Skin: Warm and dry. No obvious rash      MEDICATIONS:  MEDICATIONS  (STANDING):  dextrose 5% + sodium chloride 0.45%. 1000 milliLiter(s) (75 mL/Hr) IV Continuous <Continuous>  influenza   Vaccine 0.5 milliLiter(s) IntraMuscular once  multivitamin 1 Tablet(s) Oral daily  thiamine 100 milliGRAM(s) Oral daily    MEDICATIONS  (PRN):  acetaminophen     Tablet .. 650 milliGRAM(s) Oral every 6 hours PRN Temp greater or equal to 38C (100.4F), Mild Pain (1 - 3)  aluminum hydroxide/magnesium hydroxide/simethicone Suspension 30 milliLiter(s) Oral every 4 hours PRN Dyspepsia  HYDROmorphone   Tablet 4 milliGRAM(s) Oral every 4 hours PRN Severe Pain (7 - 10)  HYDROmorphone   Tablet 2 milliGRAM(s) Oral every 4 hours PRN Moderate Pain (4 - 6)  melatonin 3 milliGRAM(s) Oral at bedtime PRN Insomnia  ondansetron Injectable 4 milliGRAM(s) IV Push every 8 hours PRN Nausea and/or Vomiting      ALLERGIES:  Allergies    No Known Allergies    Intolerances        LABS:                        8.8    14.64 )-----------( 251      ( 25 Oct 2023 05:30 )             28.0     10-25    135  |  99  |  30<H>  ----------------------------<  132<H>  4.3   |  21<L>  |  1.48<H>    Ca    8.5      25 Oct 2023 05:30  Phos  1.9     10-25  Mg     2.2     10-25    TPro  6.6  /  Alb  2.5<L>  /  TBili  1.7<H>  /  DBili  x   /  AST  234<H>  /  ALT  38  /  AlkPhos  568<H>  10-25      Urinalysis Basic - ( 25 Oct 2023 05:30 )    Color: x / Appearance: x / SG: x / pH: x  Gluc: 132 mg/dL / Ketone: x  / Bili: x / Urobili: x   Blood: x / Protein: x / Nitrite: x   Leuk Esterase: x / RBC: x / WBC x   Sq Epi: x / Non Sq Epi: x / Bacteria: x      CAPILLARY BLOOD GLUCOSE            RADIOLOGY & ADDITIONAL TESTS: Reviewed.

## 2023-10-25 NOTE — PROGRESS NOTE ADULT - ATTENDING COMMENTS
62 y/o male with no pmh presented to ED w/ abdominal pain for 4 weeks. Pt has lost 20 lbs in past 8 weeks and has complained of "pencil like" stools. CT ab/pelv on arrival revealed low rectal mass and hepatomegaly w/ innumerable masses c/w extensive mets. Liver biopsy confirmed adenocarcinoma with colonic origin.       Problem List  #metastatic Rectal Cancer  #severe protein calorie malnutrition  #ATN  #Starvation ketosis  #debility    Plan  -Continue to monitor Cr  -UA/Urine Lytes  -Would hold additional fluids at this time  -Anticipated D/C in AM

## 2023-10-25 NOTE — PROGRESS NOTE ADULT - SUBJECTIVE AND OBJECTIVE BOX
******INCOMPLETE******    Patient is a 63y old  Male who presents with a chief complaint of abdominal pain (24 Oct 2023 20:14)    OVERNIGHT EVENTS/INTERVAL HPI:    REVIEW OF SYSTEMS:  All other review of systems is negative unless indicated above.    OBJECTIVE:  T(C): 37.7 (10-25-23 @ 06:10), Max: 37.7 (10-25-23 @ 06:10)  HR: 111 (10-25-23 @ 06:10) (91 - 111)  BP: 130/79 (10-25-23 @ 06:10) (101/64 - 131/84)  RR: 17 (10-25-23 @ 06:10) (17 - 18)  SpO2: 96% (10-25-23 @ 06:10) (96% - 99%)  Daily Height in cm: 177.8 (24 Oct 2023 11:42)    Daily     Physical Exam:  General: in no acute distress  Eyes: EOMI intact bilaterally. Anicteric sclerae, moist conjunctivae  HENT: Moist mucous membranes  Neck: Trachea midline, supple  Lungs: CTA B/L. No wheezes, rales, or rhonchi  Cardiovascular: RRR. No murmurs, rubs, or gallops  Abdomen: Soft, non-tender non-distended; No rebound or guarding  Extremities: WWP, No clubbing, cyanosis or edema  MSK: No midline bony tenderness. No CVA tenderness bilaterally  Neurological: Alert and oriented x3  Skin: Warm and dry. No obvious rash     Medications:  MEDICATIONS  (STANDING):  dextrose 5% + sodium chloride 0.45%. 1000 milliLiter(s) (75 mL/Hr) IV Continuous <Continuous>  influenza   Vaccine 0.5 milliLiter(s) IntraMuscular once  multivitamin 1 Tablet(s) Oral daily  thiamine 100 milliGRAM(s) Oral daily    MEDICATIONS  (PRN):  acetaminophen     Tablet .. 650 milliGRAM(s) Oral every 6 hours PRN Temp greater or equal to 38C (100.4F), Mild Pain (1 - 3)  aluminum hydroxide/magnesium hydroxide/simethicone Suspension 30 milliLiter(s) Oral every 4 hours PRN Dyspepsia  melatonin 3 milliGRAM(s) Oral at bedtime PRN Insomnia  morphine  - Injectable 4 milliGRAM(s) IV Push every 4 hours PRN Severe Pain (7 - 10)  morphine  IR 15 milliGRAM(s) Oral every 4 hours PRN Moderate Pain (4 - 6)  ondansetron Injectable 4 milliGRAM(s) IV Push every 8 hours PRN Nausea and/or Vomiting      Labs:                        8.8    14.64 )-----------( 251      ( 25 Oct 2023 05:30 )             28.0     10-25    135  |  99  |  x   ----------------------------<  132<H>  4.3   |  x   |  x     Ca    8.5      25 Oct 2023 05:30  Phos  3.5     10-24  Mg     2.2     10-25    TPro  7.2  /  Alb  2.6<L>  /  TBili  1.7<H>  /  DBili  x   /  AST  240<H>  /  ALT  38  /  AlkPhos  575<H>  10-24      Urinalysis Basic - ( 25 Oct 2023 05:30 )    Color: x / Appearance: x / SG: x / pH: x  Gluc: 132 mg/dL / Ketone: x  / Bili: x / Urobili: x   Blood: x / Protein: x / Nitrite: x   Leuk Esterase: x / RBC: x / WBC x   Sq Epi: x / Non Sq Epi: x / Bacteria: x          Radiology: Reviewed OVERNIGHT EVENTS/INTERVAL HPI: No acute events o/n. This morning, pt continues to report abd pain in the umbilicus and RUQ. Pt endorses appetite. BM this morning with no blood, very loose. Denies headache, nausea, vomiting, chest pain.     REVIEW OF SYSTEMS:  All other review of systems is negative unless indicated above.    OBJECTIVE:  T(C): 37.7 (10-25-23 @ 06:10), Max: 37.7 (10-25-23 @ 06:10)  HR: 111 (10-25-23 @ 06:10) (91 - 111)  BP: 130/79 (10-25-23 @ 06:10) (101/64 - 131/84)  RR: 17 (10-25-23 @ 06:10) (17 - 18)  SpO2: 96% (10-25-23 @ 06:10) (96% - 99%)  Daily Height in cm: 177.8 (24 Oct 2023 11:42)    Daily     Physical Exam:  General: in no acute distress  Eyes: EOMI intact bilaterally.  Lungs: CTA B/L. No wheezes, rales, or rhonchi  Cardiovascular: RRR. No murmurs, rubs, or gallops  Abdomen: enlarged and hardened liver. clean bandage on RUQ. abd slightly distended. BM presents  Extremities: WWP, No clubbing, cyanosis or edema  MSK: 5/5  strength b/l  Neurological: Alert and oriented x3  Skin: Warm and dry. No obvious rash     Medications:  MEDICATIONS  (STANDING):  dextrose 5% + sodium chloride 0.45%. 1000 milliLiter(s) (75 mL/Hr) IV Continuous <Continuous>  influenza   Vaccine 0.5 milliLiter(s) IntraMuscular once  multivitamin 1 Tablet(s) Oral daily  thiamine 100 milliGRAM(s) Oral daily    MEDICATIONS  (PRN):  acetaminophen     Tablet .. 650 milliGRAM(s) Oral every 6 hours PRN Temp greater or equal to 38C (100.4F), Mild Pain (1 - 3)  aluminum hydroxide/magnesium hydroxide/simethicone Suspension 30 milliLiter(s) Oral every 4 hours PRN Dyspepsia  melatonin 3 milliGRAM(s) Oral at bedtime PRN Insomnia  morphine  - Injectable 4 milliGRAM(s) IV Push every 4 hours PRN Severe Pain (7 - 10)  morphine  IR 15 milliGRAM(s) Oral every 4 hours PRN Moderate Pain (4 - 6)  ondansetron Injectable 4 milliGRAM(s) IV Push every 8 hours PRN Nausea and/or Vomiting      Labs:                        8.8    14.64 )-----------( 251      ( 25 Oct 2023 05:30 )             28.0     10-25    135  |  99  |  x   ----------------------------<  132<H>  4.3   |  x   |  x     Ca    8.5      25 Oct 2023 05:30  Phos  3.5     10-24  Mg     2.2     10-25    TPro  7.2  /  Alb  2.6<L>  /  TBili  1.7<H>  /  DBili  x   /  AST  240<H>  /  ALT  38  /  AlkPhos  575<H>  10-24      Urinalysis Basic - ( 25 Oct 2023 05:30 )    Color: x / Appearance: x / SG: x / pH: x  Gluc: 132 mg/dL / Ketone: x  / Bili: x / Urobili: x   Blood: x / Protein: x / Nitrite: x   Leuk Esterase: x / RBC: x / WBC x   Sq Epi: x / Non Sq Epi: x / Bacteria: x          Radiology: Reviewed

## 2023-10-25 NOTE — PROGRESS NOTE ADULT - ASSESSMENT
64 yo M w/ no significant PMHx who recently saw Dr. Julian in clinic for initial evaluation of abdominal bulge and significant unintentional weight loss. On exam, afebrile, hemodynamically stable, abdomen soft in lower quadrants but firm in upper abdomen with concern for abdominal mass, no hernia appreciated, non tender, nondistended, no rebound or guarding. Labs notable for leukocytosis with L shift, transaminitis, elevated ALP and hyperbilrubinemia. CT A/P with PO & IV contrast notable for low rectal mass s/f rectal ca, hepatomegaly w/ innumerable masses c/w extensive mets,     Plan/Recs:   - Heme/Onc f/u outpatient  - CT chest with lung nodules not concerning for mets  - f/u colonoscopy bx  - IR biopsy supports mets of colon origin  - f/u MRI  - Miralax as needed outpatient for constipation  - No need for outpatient CRS follow up unless obstructive symptoms present

## 2023-10-26 ENCOUNTER — APPOINTMENT (OUTPATIENT)
Age: 63
End: 2023-10-26

## 2023-10-26 ENCOUNTER — TRANSCRIPTION ENCOUNTER (OUTPATIENT)
Age: 63
End: 2023-10-26

## 2023-10-26 DIAGNOSIS — N17.0 ACUTE KIDNEY FAILURE WITH TUBULAR NECROSIS: ICD-10-CM

## 2023-10-26 DIAGNOSIS — C20 MALIGNANT NEOPLASM OF RECTUM: ICD-10-CM

## 2023-10-26 DIAGNOSIS — T73.0XXA STARVATION, INITIAL ENCOUNTER: ICD-10-CM

## 2023-10-26 DIAGNOSIS — E43 UNSPECIFIED SEVERE PROTEIN-CALORIE MALNUTRITION: ICD-10-CM

## 2023-10-26 LAB
ALBUMIN SERPL ELPH-MCNC: 2.6 G/DL — LOW (ref 3.3–5)
ALBUMIN SERPL ELPH-MCNC: 2.6 G/DL — LOW (ref 3.3–5)
ALP SERPL-CCNC: 664 U/L — HIGH (ref 40–120)
ALP SERPL-CCNC: 664 U/L — HIGH (ref 40–120)
ALT FLD-CCNC: 42 U/L — SIGNIFICANT CHANGE UP (ref 10–45)
ALT FLD-CCNC: 42 U/L — SIGNIFICANT CHANGE UP (ref 10–45)
ANION GAP SERPL CALC-SCNC: 15 MMOL/L — SIGNIFICANT CHANGE UP (ref 5–17)
ANION GAP SERPL CALC-SCNC: 15 MMOL/L — SIGNIFICANT CHANGE UP (ref 5–17)
ANION GAP SERPL CALC-SCNC: 16 MMOL/L — SIGNIFICANT CHANGE UP (ref 5–17)
ANION GAP SERPL CALC-SCNC: 16 MMOL/L — SIGNIFICANT CHANGE UP (ref 5–17)
AST SERPL-CCNC: 217 U/L — HIGH (ref 10–40)
AST SERPL-CCNC: 217 U/L — HIGH (ref 10–40)
BASOPHILS # BLD AUTO: 0.05 K/UL — SIGNIFICANT CHANGE UP (ref 0–0.2)
BASOPHILS # BLD AUTO: 0.05 K/UL — SIGNIFICANT CHANGE UP (ref 0–0.2)
BASOPHILS NFR BLD AUTO: 0.3 % — SIGNIFICANT CHANGE UP (ref 0–2)
BASOPHILS NFR BLD AUTO: 0.3 % — SIGNIFICANT CHANGE UP (ref 0–2)
BILIRUB SERPL-MCNC: 2 MG/DL — HIGH (ref 0.2–1.2)
BILIRUB SERPL-MCNC: 2 MG/DL — HIGH (ref 0.2–1.2)
BLD GP AB SCN SERPL QL: NEGATIVE — SIGNIFICANT CHANGE UP
BLD GP AB SCN SERPL QL: NEGATIVE — SIGNIFICANT CHANGE UP
BUN SERPL-MCNC: 29 MG/DL — HIGH (ref 7–23)
BUN SERPL-MCNC: 29 MG/DL — HIGH (ref 7–23)
BUN SERPL-MCNC: 30 MG/DL — HIGH (ref 7–23)
BUN SERPL-MCNC: 30 MG/DL — HIGH (ref 7–23)
CALCIUM SERPL-MCNC: 8.6 MG/DL — SIGNIFICANT CHANGE UP (ref 8.4–10.5)
CALCIUM SERPL-MCNC: 8.6 MG/DL — SIGNIFICANT CHANGE UP (ref 8.4–10.5)
CALCIUM SERPL-MCNC: 8.8 MG/DL — SIGNIFICANT CHANGE UP (ref 8.4–10.5)
CALCIUM SERPL-MCNC: 8.8 MG/DL — SIGNIFICANT CHANGE UP (ref 8.4–10.5)
CHLORIDE SERPL-SCNC: 97 MMOL/L — SIGNIFICANT CHANGE UP (ref 96–108)
CO2 SERPL-SCNC: 17 MMOL/L — LOW (ref 22–31)
CO2 SERPL-SCNC: 17 MMOL/L — LOW (ref 22–31)
CO2 SERPL-SCNC: 20 MMOL/L — LOW (ref 22–31)
CO2 SERPL-SCNC: 20 MMOL/L — LOW (ref 22–31)
CREAT SERPL-MCNC: 1.56 MG/DL — HIGH (ref 0.5–1.3)
CREAT SERPL-MCNC: 1.56 MG/DL — HIGH (ref 0.5–1.3)
CREAT SERPL-MCNC: 1.7 MG/DL — HIGH (ref 0.5–1.3)
CREAT SERPL-MCNC: 1.7 MG/DL — HIGH (ref 0.5–1.3)
EGFR: 45 ML/MIN/1.73M2 — LOW
EGFR: 45 ML/MIN/1.73M2 — LOW
EGFR: 50 ML/MIN/1.73M2 — LOW
EGFR: 50 ML/MIN/1.73M2 — LOW
EOSINOPHIL # BLD AUTO: 0.05 K/UL — SIGNIFICANT CHANGE UP (ref 0–0.5)
EOSINOPHIL # BLD AUTO: 0.05 K/UL — SIGNIFICANT CHANGE UP (ref 0–0.5)
EOSINOPHIL NFR BLD AUTO: 0.3 % — SIGNIFICANT CHANGE UP (ref 0–6)
EOSINOPHIL NFR BLD AUTO: 0.3 % — SIGNIFICANT CHANGE UP (ref 0–6)
GLUCOSE SERPL-MCNC: 119 MG/DL — HIGH (ref 70–99)
GLUCOSE SERPL-MCNC: 119 MG/DL — HIGH (ref 70–99)
GLUCOSE SERPL-MCNC: 131 MG/DL — HIGH (ref 70–99)
GLUCOSE SERPL-MCNC: 131 MG/DL — HIGH (ref 70–99)
HCT VFR BLD CALC: 29 % — LOW (ref 39–50)
HCT VFR BLD CALC: 29 % — LOW (ref 39–50)
HGB BLD-MCNC: 9.1 G/DL — LOW (ref 13–17)
HGB BLD-MCNC: 9.1 G/DL — LOW (ref 13–17)
IMM GRANULOCYTES NFR BLD AUTO: 1 % — HIGH (ref 0–0.9)
IMM GRANULOCYTES NFR BLD AUTO: 1 % — HIGH (ref 0–0.9)
LYMPHOCYTES # BLD AUTO: 0.82 K/UL — LOW (ref 1–3.3)
LYMPHOCYTES # BLD AUTO: 0.82 K/UL — LOW (ref 1–3.3)
LYMPHOCYTES # BLD AUTO: 5.3 % — LOW (ref 13–44)
LYMPHOCYTES # BLD AUTO: 5.3 % — LOW (ref 13–44)
MAGNESIUM SERPL-MCNC: 2.1 MG/DL — SIGNIFICANT CHANGE UP (ref 1.6–2.6)
MAGNESIUM SERPL-MCNC: 2.1 MG/DL — SIGNIFICANT CHANGE UP (ref 1.6–2.6)
MAGNESIUM SERPL-MCNC: 2.2 MG/DL — SIGNIFICANT CHANGE UP (ref 1.6–2.6)
MAGNESIUM SERPL-MCNC: 2.2 MG/DL — SIGNIFICANT CHANGE UP (ref 1.6–2.6)
MCHC RBC-ENTMCNC: 25.5 PG — LOW (ref 27–34)
MCHC RBC-ENTMCNC: 25.5 PG — LOW (ref 27–34)
MCHC RBC-ENTMCNC: 31.4 GM/DL — LOW (ref 32–36)
MCHC RBC-ENTMCNC: 31.4 GM/DL — LOW (ref 32–36)
MCV RBC AUTO: 81.2 FL — SIGNIFICANT CHANGE UP (ref 80–100)
MCV RBC AUTO: 81.2 FL — SIGNIFICANT CHANGE UP (ref 80–100)
MONOCYTES # BLD AUTO: 1.32 K/UL — HIGH (ref 0–0.9)
MONOCYTES # BLD AUTO: 1.32 K/UL — HIGH (ref 0–0.9)
MONOCYTES NFR BLD AUTO: 8.6 % — SIGNIFICANT CHANGE UP (ref 2–14)
MONOCYTES NFR BLD AUTO: 8.6 % — SIGNIFICANT CHANGE UP (ref 2–14)
NEUTROPHILS # BLD AUTO: 12.93 K/UL — HIGH (ref 1.8–7.4)
NEUTROPHILS # BLD AUTO: 12.93 K/UL — HIGH (ref 1.8–7.4)
NEUTROPHILS NFR BLD AUTO: 84.5 % — HIGH (ref 43–77)
NEUTROPHILS NFR BLD AUTO: 84.5 % — HIGH (ref 43–77)
NRBC # BLD: 0 /100 WBCS — SIGNIFICANT CHANGE UP (ref 0–0)
NRBC # BLD: 0 /100 WBCS — SIGNIFICANT CHANGE UP (ref 0–0)
PHOSPHATE SERPL-MCNC: 2 MG/DL — LOW (ref 2.5–4.5)
PHOSPHATE SERPL-MCNC: 2 MG/DL — LOW (ref 2.5–4.5)
PHOSPHATE SERPL-MCNC: 2.1 MG/DL — LOW (ref 2.5–4.5)
PHOSPHATE SERPL-MCNC: 2.1 MG/DL — LOW (ref 2.5–4.5)
PLATELET # BLD AUTO: 434 K/UL — HIGH (ref 150–400)
PLATELET # BLD AUTO: 434 K/UL — HIGH (ref 150–400)
POTASSIUM SERPL-MCNC: 4.6 MMOL/L — SIGNIFICANT CHANGE UP (ref 3.5–5.3)
POTASSIUM SERPL-MCNC: 4.6 MMOL/L — SIGNIFICANT CHANGE UP (ref 3.5–5.3)
POTASSIUM SERPL-MCNC: 4.8 MMOL/L — SIGNIFICANT CHANGE UP (ref 3.5–5.3)
POTASSIUM SERPL-MCNC: 4.8 MMOL/L — SIGNIFICANT CHANGE UP (ref 3.5–5.3)
POTASSIUM SERPL-SCNC: 4.6 MMOL/L — SIGNIFICANT CHANGE UP (ref 3.5–5.3)
POTASSIUM SERPL-SCNC: 4.6 MMOL/L — SIGNIFICANT CHANGE UP (ref 3.5–5.3)
POTASSIUM SERPL-SCNC: 4.8 MMOL/L — SIGNIFICANT CHANGE UP (ref 3.5–5.3)
POTASSIUM SERPL-SCNC: 4.8 MMOL/L — SIGNIFICANT CHANGE UP (ref 3.5–5.3)
PROT SERPL-MCNC: 7.1 G/DL — SIGNIFICANT CHANGE UP (ref 6–8.3)
PROT SERPL-MCNC: 7.1 G/DL — SIGNIFICANT CHANGE UP (ref 6–8.3)
RBC # BLD: 3.57 M/UL — LOW (ref 4.2–5.8)
RBC # BLD: 3.57 M/UL — LOW (ref 4.2–5.8)
RBC # FLD: 18.2 % — HIGH (ref 10.3–14.5)
RBC # FLD: 18.2 % — HIGH (ref 10.3–14.5)
RH IG SCN BLD-IMP: POSITIVE — SIGNIFICANT CHANGE UP
RH IG SCN BLD-IMP: POSITIVE — SIGNIFICANT CHANGE UP
SODIUM SERPL-SCNC: 130 MMOL/L — LOW (ref 135–145)
SODIUM SERPL-SCNC: 130 MMOL/L — LOW (ref 135–145)
SODIUM SERPL-SCNC: 132 MMOL/L — LOW (ref 135–145)
SODIUM SERPL-SCNC: 132 MMOL/L — LOW (ref 135–145)
SURGICAL PATHOLOGY STUDY: SIGNIFICANT CHANGE UP
SURGICAL PATHOLOGY STUDY: SIGNIFICANT CHANGE UP
WBC # BLD: 15.33 K/UL — HIGH (ref 3.8–10.5)
WBC # BLD: 15.33 K/UL — HIGH (ref 3.8–10.5)
WBC # FLD AUTO: 15.33 K/UL — HIGH (ref 3.8–10.5)
WBC # FLD AUTO: 15.33 K/UL — HIGH (ref 3.8–10.5)

## 2023-10-26 PROCEDURE — 99232 SBSQ HOSP IP/OBS MODERATE 35: CPT | Mod: GC

## 2023-10-26 PROCEDURE — 99233 SBSQ HOSP IP/OBS HIGH 50: CPT

## 2023-10-26 PROCEDURE — 99497 ADVNCD CARE PLAN 30 MIN: CPT | Mod: 25

## 2023-10-26 RX ORDER — SODIUM,POTASSIUM PHOSPHATES 278-250MG
1 POWDER IN PACKET (EA) ORAL ONCE
Refills: 0 | Status: COMPLETED | OUTPATIENT
Start: 2023-10-26 | End: 2023-10-26

## 2023-10-26 RX ORDER — SODIUM CHLORIDE 9 MG/ML
1000 INJECTION, SOLUTION INTRAVENOUS
Refills: 0 | Status: DISCONTINUED | OUTPATIENT
Start: 2023-10-26 | End: 2023-10-27

## 2023-10-26 RX ADMIN — Medication 100 MILLIGRAM(S): at 11:13

## 2023-10-26 RX ADMIN — HYDROMORPHONE HYDROCHLORIDE 2 MILLIGRAM(S): 2 INJECTION INTRAMUSCULAR; INTRAVENOUS; SUBCUTANEOUS at 07:04

## 2023-10-26 RX ADMIN — Medication 1 PACKET(S): at 11:12

## 2023-10-26 RX ADMIN — HYDROMORPHONE HYDROCHLORIDE 2 MILLIGRAM(S): 2 INJECTION INTRAMUSCULAR; INTRAVENOUS; SUBCUTANEOUS at 08:16

## 2023-10-26 RX ADMIN — HYDROMORPHONE HYDROCHLORIDE 4 MILLIGRAM(S): 2 INJECTION INTRAMUSCULAR; INTRAVENOUS; SUBCUTANEOUS at 23:30

## 2023-10-26 RX ADMIN — SODIUM CHLORIDE 75 MILLILITER(S): 9 INJECTION, SOLUTION INTRAVENOUS at 11:12

## 2023-10-26 RX ADMIN — HYDROMORPHONE HYDROCHLORIDE 4 MILLIGRAM(S): 2 INJECTION INTRAMUSCULAR; INTRAVENOUS; SUBCUTANEOUS at 22:27

## 2023-10-26 RX ADMIN — Medication 1 TABLET(S): at 11:13

## 2023-10-26 NOTE — PROGRESS NOTE ADULT - SUBJECTIVE AND OBJECTIVE BOX
OVERNIGHT EVENTS/INTERVAL HPI: o/n no acute events. this morning, no acute complaints. States he does not want his sister from [place] to visit just yet and has decided to continue treatment in [place]. Reports loose BM this morning, no melena or hematochezia    REVIEW OF SYSTEMS:  All other review of systems is negative unless indicated above.    OBJECTIVE:  T(C): 36.9 (10-26-23 @ 12:29), Max: 37 (10-26-23 @ 06:51)  HR: 104 (10-26-23 @ 12:29) (97 - 115)  BP: 122/73 (10-26-23 @ 12:29) (117/76 - 122/78)  RR: 18 (10-26-23 @ 12:29) (16 - 18)  SpO2: 97% (10-26-23 @ 12:29) (97% - 97%)  Daily     Daily     Physical Exam:  General: in no acute distress  Eyes: EOMI intact bilaterally.  Lungs: CTA B/L. No wheezes, rales, or rhonchi  Cardiovascular: RRR. No murmurs, rubs, or gallops  Abdomen: enlarged and hardened liver. clean bandage on RUQ. abd slightly distended. BM presents  Extremities: WWP, No clubbing, cyanosis or edema  MSK: 5/5  strength b/l  Neurological: Alert and oriented x3  Skin: Warm and dry. No obvious rash      Medications:  MEDICATIONS  (STANDING):  influenza   Vaccine 0.5 milliLiter(s) IntraMuscular once  multivitamin 1 Tablet(s) Oral daily  sodium chloride 0.9% 1000 milliLiter(s) (75 mL/Hr) IV Continuous <Continuous>  thiamine 100 milliGRAM(s) Oral daily    MEDICATIONS  (PRN):  acetaminophen     Tablet .. 650 milliGRAM(s) Oral every 6 hours PRN Temp greater or equal to 38C (100.4F), Mild Pain (1 - 3)  aluminum hydroxide/magnesium hydroxide/simethicone Suspension 30 milliLiter(s) Oral every 4 hours PRN Dyspepsia  HYDROmorphone   Tablet 4 milliGRAM(s) Oral every 4 hours PRN Severe Pain (7 - 10)  HYDROmorphone   Tablet 2 milliGRAM(s) Oral every 4 hours PRN Moderate Pain (4 - 6)  melatonin 3 milliGRAM(s) Oral at bedtime PRN Insomnia  ondansetron Injectable 4 milliGRAM(s) IV Push every 8 hours PRN Nausea and/or Vomiting      Labs:                        9.1    15.33 )-----------( 434      ( 26 Oct 2023 05:30 )             29.0     10-26    130<L>  |  97  |  30<H>  ----------------------------<  119<H>  4.8   |  17<L>  |  1.70<H>    Ca    8.8      26 Oct 2023 05:30  Phos  2.0     10-26  Mg     2.2     10-26    TPro  7.1  /  Alb  2.6<L>  /  TBili  2.0<H>  /  DBili  x   /  AST  217<H>  /  ALT  42  /  AlkPhos  664<H>  10-26      Urinalysis Basic - ( 26 Oct 2023 05:30 )    Color: x / Appearance: x / SG: x / pH: x  Gluc: 119 mg/dL / Ketone: x  / Bili: x / Urobili: x   Blood: x / Protein: x / Nitrite: x   Leuk Esterase: x / RBC: x / WBC x   Sq Epi: x / Non Sq Epi: x / Bacteria: x          Radiology: Reviewed

## 2023-10-26 NOTE — PROGRESS NOTE ADULT - SUBJECTIVE AND OBJECTIVE BOX
SUBJECTIVE/OBJECTIVE: This morning pt c/o stable fatigue, fair appetite,  acute on chronic RUQ pain. Patient required dil 2 mg PO PRN x3 in the last 24hours for management of acute pain, which was effective. No unexpected adverse effects of opiates noted: no sedation/dizziness/nausea/myoclonus. Comprehensive symptom assessment and GOC exploration as noted below. Extensive time spent discussing plan of care with patient.    ALLERGIES: No Known Allergies    MEDICATIONS: REVIEWED  MEDICATIONS  (STANDING):  influenza   Vaccine 0.5 milliLiter(s) IntraMuscular once  multivitamin 1 Tablet(s) Oral daily  sodium chloride 0.9% 1000 milliLiter(s) (75 mL/Hr) IV Continuous <Continuous>  thiamine 100 milliGRAM(s) Oral daily    MEDICATIONS  (PRN):  acetaminophen     Tablet .. 650 milliGRAM(s) Oral every 6 hours PRN Temp greater or equal to 38C (100.4F), Mild Pain (1 - 3)  aluminum hydroxide/magnesium hydroxide/simethicone Suspension 30 milliLiter(s) Oral every 4 hours PRN Dyspepsia  HYDROmorphone   Tablet 4 milliGRAM(s) Oral every 4 hours PRN Severe Pain (7 - 10)  HYDROmorphone   Tablet 2 milliGRAM(s) Oral every 4 hours PRN Moderate Pain (4 - 6)  melatonin 3 milliGRAM(s) Oral at bedtime PRN Insomnia  ondansetron Injectable 4 milliGRAM(s) IV Push every 8 hours PRN Nausea and/or Vomiting        PRESENT SYMPTOMS:   [ ]Unable to obtain due to poor mentation   Source if other than patient:  [ ]Family   [ ]Team     Pain [ x ]  Some interference with ADLs   Location :      RUQ  Quality:  deep ache, pushing   Radiation: diffuse Right side   Timing: constant with BTP  Aggravating factors: lying on R side, deep palpation   Minimal acceptable level (0-10 scale): 2/10  Severity in last 24h (0-10 scale) : 8/10  Current score (0-10 scale): 3/10  Improves with:  dilaudid     PAIN AD Score:  0  [email or web address] (press ctrl +  left click to view)    Analgesic Use (Scheduled and PRNs) for past 24 hours (6a-6a):  - dil 2 mg PO q4 PRN x3    If [  ], pt denies symptom.   Dyspnea:         [  ]  Anxiety:           [  ]  Difficulty sleeping: [  ]  Fatigue:           [ x ] moderate  Nausea:           [  x] intermittent prior to admission, has been without during hospitalization  Loss of appetite:     [  ] good/fair appetite, severe early satiety   Dysphagia: [  ]  Constipation:   [  ]        LBM 10/24  Other Symptoms:    All other review of systems negative [ x ]    ECOG Performance:     2  Current Palliative Performance Scale/Karnofsky Score:   50 %  Preadmit Karnofsky:  60/70 %          PEx:  General: cachectic man sitting up in bed, NAD  alert  oriented x  3   verbal   Behavioral: Calm pleasant   HEENT: atraumatic,  + temporal wasting,  No dry mouth  RESP: Reg rhythm, No  tachypnea/labored breathing,  No audible excessive secretions,  CTAB, diminished bilat bases  CV: RRR, S1S2,  +tachycardia  GI: flat, soft lower quadrants, semi firm RUQ, mild tenderness to palpation, hypoactive bs   MUSK: weakness x4,No  edema, ambulatory   SKIN: No abnormal skin lesions, Poor skin turgor, mild jaundice   NEURO:  No deficits, cognitive impairment, encephalopathic dsyphagia dysarthria paresis  Oral intake ability:  full capability    T(C): 37.5 (10-27-23 @ 05:15), Max: 37.5 (10-27-23 @ 05:15)  HR: 114 (10-27-23 @ 05:15) (104 - 114)  BP: 122/70 (10-27-23 @ 05:15) (122/70 - 130/78)  RR: 18 (10-27-23 @ 05:15) (17 - 18)  SpO2: 94% (10-27-23 @ 05:15) (94% - 97%)  Wt(kg): --    LABS: REVIEWED  CBC:                        9.1    15.33 )-----------( 434      ( 26 Oct 2023 05:30 )             29.0     CMP:    10-26    132<L>  |  97  |  29<H>  ----------------------------<  131<H>  4.6   |  20<L>  |  1.56<H>    Ca    8.6      26 Oct 2023 16:41  Phos  2.1     10-26  Mg     2.1     10-26    TPro  7.1  /  Alb  2.6<L>  /  TBili  2.0<H>  /  DBili  x   /  AST  217<H>  /  ALT  42  /  AlkPhos  664<H>  10-26      RADIOLOGY & ADDITIONAL STUDIES:   - reviewd    DISCUSSION OF CASE:  - discussed GOC with patient as below, primary team updated on discussion

## 2023-10-26 NOTE — PROGRESS NOTE ADULT - PROBLEM SELECTOR PLAN 1
.  mixed somatic/visceral RUQ pain likely 2/2 mechanical stretch and tissue injury of liver iso diffuse liver lesions cf metastatic disease. Ineffective relief with prn tylenol at home. Now with kidney dysfunction  - cw dil 2mg-4mg POq4 PRN moderate/severe pain  - give dil 0.5 mg IVP prn severe pain crisis or pain refractory to PO  - Hold opioids for somnolence or RR<12   - recommend senna 2 tabs PO qHS standing, mLax in the morning   - heme onc cs/outpatient fu  - outpatient palliative referral

## 2023-10-26 NOTE — PROGRESS NOTE ADULT - PROBLEM SELECTOR PLAN 1
CT abd and pelvis with rectal mass concerning for malignancy, periportal lymphadenopathy, and multiple liver lesions. Pt following with Dr. Julian and had elevated CEA and CA19 on outpatient labs. Concern for malignancy given lesions in liver. Hep panel negative. Elevated CEA, CA 19.9, , AFP wnl. s/p .2mg IV dilaudid. Surgery, palliative, heme/onc following. CT chest with multiple benign nodules. Liver biopsy: adenocarcinoma likely colonic origin  -Plan for outpatient heme/onc visit  -f/u MRI pelvis outpatient  -Per palliative, symptomatic management for pain: dilaudid po 2mg prn and dilaudid po 4mg prn

## 2023-10-26 NOTE — PROGRESS NOTE ADULT - SUBJECTIVE AND OBJECTIVE BOX
Hematology Oncology Progress Note        SUBJECTIVE: Patient seen and examined at bedside. Feels cold today with shivers. Appetite is okay, but still feels epigastric fullness. Denies fever, headache, nausea, vomiting, chest pain, shortness of breath, changes in urination.     OBJECTIVE:    VITAL SIGNS:  ICU Vital Signs Last 24 Hrs  T(C): 36.9 (26 Oct 2023 12:29), Max: 37 (26 Oct 2023 06:51)  T(F): 98.5 (26 Oct 2023 12:29), Max: 98.6 (26 Oct 2023 06:51)  HR: 104 (26 Oct 2023 12:29) (97 - 115)  BP: 122/73 (26 Oct 2023 12:29) (117/76 - 122/78)  BP(mean): --  ABP: --  ABP(mean): --  RR: 18 (26 Oct 2023 12:29) (16 - 18)  SpO2: 97% (26 Oct 2023 12:29) (97% - 97%)    O2 Parameters below as of 26 Oct 2023 12:29  Patient On (Oxygen Delivery Method): room air              CAPILLARY BLOOD GLUCOSE          PHYSICAL EXAM:  General: cachectic, chronically ill appearing, answering questions, pleasantly conversant  HEENT: NC/AT; PERRL, clear conjunctiva  Neck: supple  Respiratory: CTA b/l  Cardiovascular: +S1/S2; RRR  Abdomen: soft, midabdominal and RUQ exam c/w enlarged liver  Extremities: WWP, 2+ peripheral pulses b/l; no LE edema  Skin: normal color and turgor; no rash  Neurological: AAOx3    MEDICATIONS:  MEDICATIONS  (STANDING):  influenza   Vaccine 0.5 milliLiter(s) IntraMuscular once  multivitamin 1 Tablet(s) Oral daily  sodium chloride 0.9% 1000 milliLiter(s) (75 mL/Hr) IV Continuous <Continuous>  thiamine 100 milliGRAM(s) Oral daily    MEDICATIONS  (PRN):  acetaminophen     Tablet .. 650 milliGRAM(s) Oral every 6 hours PRN Temp greater or equal to 38C (100.4F), Mild Pain (1 - 3)  aluminum hydroxide/magnesium hydroxide/simethicone Suspension 30 milliLiter(s) Oral every 4 hours PRN Dyspepsia  HYDROmorphone   Tablet 2 milliGRAM(s) Oral every 4 hours PRN Moderate Pain (4 - 6)  HYDROmorphone   Tablet 4 milliGRAM(s) Oral every 4 hours PRN Severe Pain (7 - 10)  melatonin 3 milliGRAM(s) Oral at bedtime PRN Insomnia  ondansetron Injectable 4 milliGRAM(s) IV Push every 8 hours PRN Nausea and/or Vomiting      MEDICAL/SURGICAL Hx:  PAST MEDICAL & SURGICAL HISTORY:  No pertinent past medical history          ALLERGIES:  Allergies    No Known Allergies    Intolerances        LABS:                        9.1    15.33 )-----------( 434      ( 26 Oct 2023 05:30 )             29.0     10-26    130<L>  |  97  |  30<H>  ----------------------------<  119<H>  4.8   |  17<L>  |  1.70<H>    Ca    8.8      26 Oct 2023 05:30  Phos  2.0     10-26  Mg     2.2     10-26    TPro  7.1  /  Alb  2.6<L>  /  TBili  2.0<H>  /  DBili  x   /  AST  217<H>  /  ALT  42  /  AlkPhos  664<H>  10-26      Urinalysis Basic - ( 26 Oct 2023 05:30 )    Color: x / Appearance: x / SG: x / pH: x  Gluc: 119 mg/dL / Ketone: x  / Bili: x / Urobili: x   Blood: x / Protein: x / Nitrite: x   Leuk Esterase: x / RBC: x / WBC x   Sq Epi: x / Non Sq Epi: x / Bacteria: x            RADIOLOGY, PATHOLOGY, & ADDITIONAL TESTS: Reviewed.

## 2023-10-26 NOTE — PROGRESS NOTE ADULT - PROBLEM SELECTOR PLAN 3
Cr 1.28-->1.48, (Labs from 10/12 1.09). UA with casts. possibly in setting of poor PO intake vs contrast induced ATN.  bladder scan 10/23: retaining 148 cc. CT chest imaging: Striated nephrograms are present in the kidneys bilaterally, which may be a sign of acute tubular necrosis. Cr uptrending 1.70. Urine lytes with low urine sodium  - started on 75cc NS with bicarb  - getting repeat Cr at 4PM Regular diet, add Ensure Enlive 2x/day (350kcal, 20g protein per serving) and MVI  Recs from nutritionist appreciated

## 2023-10-26 NOTE — PROGRESS NOTE ADULT - NUTRITIONAL ASSESSMENT
This patient has been assessed with a concern for Malnutrition and has been determined to have a diagnosis/diagnoses of Severe protein-calorie malnutrition and Underweight (BMI < 19).    This patient is being managed with:   Diet Regular-  Entered: Oct 24 2023  3:45PM  
This patient has been assessed with a concern for Malnutrition and has been determined to have a diagnosis/diagnoses of Severe protein-calorie malnutrition and Underweight (BMI < 19).    This patient is being managed with:   Diet NPO after Midnight-     NPO Start Date: 23-Oct-2023   NPO Start Time: 23:59  Entered: Oct 23 2023  2:58PM    Diet Clear Liquid-  Entered: Oct 23 2023  2:56PM  
This patient has been assessed with a concern for Malnutrition and has been determined to have a diagnosis/diagnoses of Severe protein-calorie malnutrition and Underweight (BMI < 19).    This patient is being managed with:   Diet Regular-  Entered: Oct 24 2023  3:45PM

## 2023-10-26 NOTE — PROGRESS NOTE ADULT - PROBLEM SELECTOR PLAN 5
.  - GOC 10/23 and 10/26  - HCP completed naming sister Irma Barbour [phone]  - DNR DNI No Peg MOLST compelted 10/26    In addition to the E/M visit, an advance care planning meeting was performed. Start time: 1000; End time: 1030; Total time: 30 min. A face to face meeting to discuss advance care planning was held today regarding:  Jeny Julian   Primary decision maker:  Patient is able to participate in decision making;  Alternate/surrogate:   Irma. Discussed advance directives including, but not limited to, healthcare proxy and code status, as well as disease trajectory, patient's values/goals, and health care options that are available for end of life care. Decision regarding code status: DNR/DNI ; Documentation completed today: [facility] note

## 2023-10-26 NOTE — PROGRESS NOTE ADULT - NS ATTEST RISK PROBLEM GEN_ALL_CORE FT
Prescription Of Parenteral Controlled Substances    Chronic Illness With Severe Exacerbation/Progression
Prescription Of Parenteral Controlled Substances    Chronic Illness With Severe Exacerbation/Progression
Decision Made To Not Resuscitate (DNR)  Prescription Of Parenteral Controlled Substances  Chronic Illness With Severe Exacerbation/Progression

## 2023-10-26 NOTE — PROGRESS NOTE ADULT - PROBLEM SELECTOR PLAN 7
D: regular  E: replete Mg <2, Phosp <3, K <4  F: 1L NS bolus  DVT ppx: held given drop in Hgb- reevaluate after 2pm CBC  Code: Full Hgb 11.7 on admission. Unkown baseline. Normocytic. Hgb dropped to 8.8. Denies melena or hematochezia. anemia in setting of GI bleed given colon cancer vs dilutional following fluids given 10/24. TIBC low consistent with anemia of chronic disease. Repeat Hgb stable 8.8-9.1  - continue to trend

## 2023-10-26 NOTE — PROGRESS NOTE ADULT - PROBLEM SELECTOR PLAN 3
.  pps 50%, requires assistance with some ADLs. lives alone with limited social support to assist with long term care.   - cw supportive care, skin care, encourage OOB  - PT cs  - Anticipate pt will need HHA support in future if pt choses to pursue DMTx here in [place]

## 2023-10-26 NOTE — PROGRESS NOTE ADULT - ASSESSMENT
Pt is 64 y/o male with no pmh presented to ED w/ abdominal pain for 4 weeks. Pt has lost 20 lbs in past 8 weeks and has complained of "pencil like" stools. CT ab/pelv on arrival revealed low rectal mass and hepatomegaly w/ innumerable masses c/w extensive mets. Liver biopsy confirmed adenocarcinoma with colonic origin.

## 2023-10-26 NOTE — PROGRESS NOTE ADULT - SUBJECTIVE AND OBJECTIVE BOX
SUBJECTIVE: Patient seen and examined at bedside, patient states that he has epigastric fullness without pain. He denies nausea and emesis.       Vital Signs Last 24 Hrs  T(C): 37 (26 Oct 2023 06:51), Max: 37 (26 Oct 2023 06:51)  T(F): 98.6 (26 Oct 2023 06:51), Max: 98.6 (26 Oct 2023 06:51)  HR: 115 (26 Oct 2023 06:51) (97 - 115)  BP: 117/76 (26 Oct 2023 06:51) (117/76 - 122/79)  BP(mean): --  RR: 16 (26 Oct 2023 06:51) (16 - 18)  SpO2: 97% (26 Oct 2023 06:51) (97% - 98%)    Parameters below as of 26 Oct 2023 06:51  Patient On (Oxygen Delivery Method): room air      I&O's Detail      General: NAD, resting comfortably in bed  C/V: NSR  Pulm: Nonlabored breathing, no respiratory distress  Abd: firm and mild ttp in epigastrum, nondistended  Extrem: WWP, no edema, SCDs in place        LABS:                        9.1    15.33 )-----------( 434      ( 26 Oct 2023 05:30 )             29.0     10-26    130<L>  |  97  |  30<H>  ----------------------------<  119<H>  4.8   |  17<L>  |  1.70<H>    Ca    8.8      26 Oct 2023 05:30  Phos  2.0     10-26  Mg     2.2     10-26    TPro  7.1  /  Alb  2.6<L>  /  TBili  2.0<H>  /  DBili  x   /  AST  217<H>  /  ALT  42  /  AlkPhos  664<H>  10-26      Urinalysis Basic - ( 26 Oct 2023 05:30 )    Color: x / Appearance: x / SG: x / pH: x  Gluc: 119 mg/dL / Ketone: x  / Bili: x / Urobili: x   Blood: x / Protein: x / Nitrite: x   Leuk Esterase: x / RBC: x / WBC x   Sq Epi: x / Non Sq Epi: x / Bacteria: x        RADIOLOGY & ADDITIONAL STUDIES:

## 2023-10-26 NOTE — DISCHARGE NOTE NURSING/CASE MANAGEMENT/SOCIAL WORK - PATIENT PORTAL LINK FT
You can access the FollowMyHealth Patient Portal offered by [facility] by registering at the following website: [email or web address]. By joining [facility]’s FollowMyHealth portal, you will also be able to view your health information using other applications (apps) compatible with our system.

## 2023-10-26 NOTE — PROGRESS NOTE ADULT - CONVERSATION DETAILS
Met with patient at bedside for ongoing support and GOC. Pt is trying to remain positive and optimistic about his future iso presumed metastatic cancer and is simultaneously at peace with the fact that his prognosis may be limited in setting of cachexia and innumerable liver lesions. GOC remain unchanged: to continue workup and explore palliative DMTx that may be offered.     Discussed risks/benefits of LST such as CPR/intubation, artificial nutrition and PEG in the context of suspected metastatic cancer, cachexia, and debility. Alternatively explained allowing a natural death. Patient feels that "death is a part of life" and is accepting of his mortality. Patient feels that heroic, invasive measures will not optimize the his life, but may cause more stress and pain. He is very clear that he wants a peaceful and dignified death. Verbal consent obtained to complete DNR DNI No PEG MOLST, copy placed in physical chart.      Questions answered, support provided.
Met with pt for ongoing support and GOC. Reviewed interval developments and medical updates including kidney dysfunction.     Pt is without existential distress and is coping appropriately in light of presumed metastatic cancer. He believes that "it might not be cancer," but absolutely plans to follow up with oncologist to discuss biopsy results and potential DMTx. I expressed concern that given his frailty, he may not be offered DMTx. We discussed role of nutritional and functional optimization in order to receive DMTx. He has been speaking to his sister, Irma, in [place] daily and providing her with updates. He would appreciate if primary team could update her as well.     Questions answered, support provided.

## 2023-10-26 NOTE — PROGRESS NOTE ADULT - PROBLEM SELECTOR PLAN 5
Pt with elevated alk phos 575 (elevated from 10/12 bloodwork) and , ALT 38. CT abd with liver lesions and small to moderate ascites. likely secondary to metastatic lesions vs infectious process. Hep panel neg  -PT 12.5 INR 1.10 PTT 27.6  - IR performed liver biopsy 10/23- adenocarcinoma with necrosis 4 weeks of R sided abd pain and early satiety. Liver biopsy confirmed metastatic adenocarcinoma with colonic origin   - GI: colonoscopy 10/24

## 2023-10-26 NOTE — DISCHARGE NOTE NURSING/CASE MANAGEMENT/SOCIAL WORK - NSDCPEFALRISK_GEN_ALL_CORE
For information on Fall & Injury Prevention, visit: [email or web address] OR  [email or web address] OR  https://www.cdc.gov/steadi/patient.html

## 2023-10-26 NOTE — PROGRESS NOTE ADULT - PROBLEM SELECTOR PLAN 2
4 weeks of R sided abd pain and early satiety. Liver biopsy confirmed metastatic adenocarcinoma with colonic origin   - GI: colonoscopy 10/24 CT abd and pelvis with rectal mass concerning for malignancy, periportal lymphadenopathy, and multiple liver lesions. Pt following with Dr. Julian and had elevated CEA and CA19 on outpatient labs. Concern for malignancy given lesions in liver. Hep panel negative. Elevated CEA, CA 19.9, , AFP wnl. s/p .2mg IV dilaudid. Surgery, palliative, heme/onc following. CT chest with multiple benign nodules. Liver biopsy: adenocarcinoma likely colonic origin  -Plan for outpatient heme/onc visit  -f/u MRI pelvis outpatient  -Per palliative, symptomatic management for pain: dilaudid po 2mg prn and dilaudid po 4mg prn

## 2023-10-26 NOTE — PROGRESS NOTE ADULT - PROVIDER SPECIALTY LIST ADULT
Palliative Care
Gastroenterology
Heme/Onc
Internal Medicine
Internal Medicine
Surgery
Internal Medicine
Internal Medicine
Palliative Care
Palliative Care

## 2023-10-26 NOTE — PROGRESS NOTE ADULT - ATTENDING COMMENTS
62 y/o male with no pmh presented to ED w/ abdominal pain for 4 weeks. Pt has lost 20 lbs in past 8 weeks and has complained of "pencil like" stools. CT ab/pelv on arrival revealed low rectal mass and hepatomegaly w/ innumerable masses c/w extensive mets. Liver biopsy confirmed adenocarcinoma with colonic origin.       Problem List  #metastatic Rectal Cancer  #severe protein calorie malnutrition  #ATN  #Starvation ketosis  #Debility     -Cr now down trended with iV fluids. Patient is medically ready for discharge.     Rest as per resident note

## 2023-10-26 NOTE — PROGRESS NOTE ADULT - ASSESSMENT
62 yo M with no pmh who pw abdominal pain and unintentional weight loss for 4 weeks.  CT ab/pelv on arrival revealed low rectal mass and hepatomegaly w/ innumerable masses c/w extensive mets. sp colonoscopy, sp bx, with path pending. Palliative consulted for complex medical decision making / symptom management in the setting of advanced illness.      - On discharge, patient should follow-up outpatient with palliative care provider, Dr. Kumari at  [address]  - Please email [email or web address] with patient's name, MRN and Dr. Kumari's name to request an outpatient appointment

## 2023-10-26 NOTE — PROGRESS NOTE ADULT - PROBLEM SELECTOR PLAN 9
, WBC 16.4, afebrile, no cough, no likely source of infection. UA few bacteria. Likely reactive to malignancy.   WBC likely reactive from Ca. Will monitor off abx

## 2023-10-26 NOTE — PROGRESS NOTE ADULT - PROBLEM SELECTOR PLAN 6
.  Complex medical decision making / symptom management in the setting of advanced illness.    GOC and symptom management recs as above  Given frailty, legitimate concern for pts poor long term prognosis even if DMTx are offered/pursued    Coping:  well[ x ] with difficulty[  ] poor coping[  ] unable to assess[  ]   Support system: strong[  ] adequate[ x ] inadequate[ x ]    Active Psychosocial Referrals:  SW/CM[ x ] PT/OT[ x ] Hospice[  ]  [place][  ]  [facility] Patient/Family Support[  ] Chaplaincy[   ]  Massage Therapy[ x ] Music Therapy [x  ] Holistic RN[  ]    Active Outpatient Palliative Care Referrals:  Supportive Oncology Clinic [x ]    - For new or uncontrolled symptoms, please call Palliative Care at [phone]. The service is available 24/7 (including nights & weekends) to provide symptom management recommendations over the phone as appropriate.  - Will continue to follow with you

## 2023-10-26 NOTE — PROGRESS NOTE ADULT - ASSESSMENT
62 yo M w/ no significant PMHx who recently saw Dr. Julian in clinic for initial evaluation of abdominal bulge and significant unintentional weight loss. On exam, afebrile, hemodynamically stable, abdomen soft in lower quadrants but firm in upper abdomen with concern for abdominal mass, no hernia appreciated, non tender, nondistended, no rebound or guarding. Labs notable for leukocytosis with L shift, transaminitis, elevated ALP and hyperbilrubinemia. CT A/P with PO & IV contrast notable for low rectal mass s/f rectal ca, hepatomegaly w/ innumerable masses c/w extensive mets,     Plan/Recs:   - Heme/Onc f/u outpatient  - f/u colonoscopy bx  - f/u MRI  - Miralax as needed outpatient for constipation  - No need for outpatient CRS follow up unless obstructive symptoms present

## 2023-10-26 NOTE — PROGRESS NOTE ADULT - PROBLEM SELECTOR PLAN 6
, WBC 16.4, afebrile, no cough, no likely source of infection. UA few bacteria. Likely reactive to malignancy. WBC downtrending 14.64 Now resolved

## 2023-10-26 NOTE — PROGRESS NOTE ADULT - PROBLEM SELECTOR PLAN 2
.  prior to admission. since resolved. likely metabolic iso cancer   - recommend zofran 4-8 mg PO q6 PRN nausea  - if qtc >500, recommend tigan 200 mg IM q8 PRN

## 2023-10-26 NOTE — PROGRESS NOTE ADULT - PROBLEM SELECTOR PLAN 4
Hgb 11.7 on admission. Unkown baseline. Normocytic. Hgb dropped to 8.8. Denies melena or hematochezia. anemia in setting of GI bleed given colon cancer vs dilutional following fluids given 10/24. TIBC low consistent with anemia of chronic disease. Repeat Hgb stable 8.8-9.1  - continue to trend Cr 1.28-->1.48, (Labs from 10/12 1.09). UA with casts. possibly in setting of poor PO intake vs contrast induced ATN.  bladder scan 10/23: retaining 148 cc. CT chest imaging: Striated nephrograms are present in the kidneys bilaterally, which may be a sign of acute tubular necrosis. Cr uptrending 1.70. Urine lytes with low urine sodium  - started on 75cc NS with bicarb  - getting repeat Cr at 4PM

## 2023-10-26 NOTE — PROGRESS NOTE ADULT - ASSESSMENT
63 M with no known PMHx (pt has not seen a doctor in 30 years since moving to [place]) who presents with 4 weeks of abdominal pain, progressively worsened over the last few days. Found to have rectal mass and suspected liver metastasis. Oncology consulted for suspected metastatic rectal cancer.    Oncologic History:  - Initially presented to [facility] ED with 4 weeks of abd pain (10/22/23), reported intentional weight loss over past year 135lbs (stable weight for 30 years) ->110lbs. Never Colonoscopy.  - CT Abdomen Pelvis (10/22/23) with 3.4 cm low rectal mass at 4.0 cm from anal verge. This is an annular mass along the right anterolateral wall of the rectum (3/136). No bowel obstruction. Innumerable hypodense masses throughout enlarged liver, consistent with extensive hepatic metastatic disease.  - Tumor Markers. CEA significantly elevated 23810 (10/23), CA 19-9 [id number].  - IR guided liver biopsy (10/23/23): Metastatic adenocarcinoma with necrosis, consistent with colonic origin. Positive CDX2 and SN26-LCF supports metastasis of colon origin (1A). MMR studies reveal intact nuclear expression for MLH1, PMS-2, MSH-2, MSH-6, indicating low probability of MSI high.  - Colonoscopy (10/23/23) non obstructing mass, "mass started at the anal verge and was 3.5 cm in length. It was non-obstructing, measuring approximately 5-7 cm in width". Path showing metastatic adenocarcinoma with necrosis, consistent with colon origin.  - CT Chest (10/24/23) for staging, with several small lung nodules. These nodules have   benign appearances and are thought to be unlikely to be metastases    # Metastatic Rectal Adenocarcinoma (preserved MMR)  Reviewed available labs, imaging, and pathology with patient.    Overall clinical picture consistent with metastatic rectal cancer. We reviewed that this is confirmed by biopsy of a distant site (liver) and would represent advanced and metastatic disease. Patient had stated that he would like to avoid chemotherapy if possible. We discussed that if the cancer is confirmed to have spread, his treatement would depend on the pathology/mutational status of the tumor. Treatment for metastatic rectal cancer would likely be a combination of chemotherapy +/- targeted therapy given intact mismatch repair proteins.     Prior to abdominal pain, patient reports being able to perform all of his ADLs. He works for [employer]. He states that he "was able to walk up and down the full staircase at the [facility]". He has no family in [place]. He lives alone, but has many friends who have been visiting him in the hospital. He lives in [facility] and is amenable to following up at the [facility] to discuss next steps.    Plan:  - Will follow up for status of ZAK and BRAF mutations and HER2 amplifications on next generation sequencing  - Systemic therapy to be discussed outpatient with Dr. Sherie Khan  - Agree with palliative care consult for symptom management  - Recommend nutrition consult for low BMI    # JOSEFINA vs JOSEFINA on CKD  Patient noted to have elevated creatinine on admission.   - Work up per primary team    Dispo:  When medically ready for discharge, please schedule an appointment with Dr. Sherie Khan at the [facility] at his scheduled appointment on 10/31/23 at 9 AM. Please note that for this specific appointment (Tuesdays only) is at her office location at [address] [place] [address]. Please ensure that discharge appointment and information is provided in paperwork prior to discharge.    Discussed with hematology oncology attending Dr. Karime Amaya. Recommendations are considered final after attending attestation.

## 2023-10-27 VITALS
DIASTOLIC BLOOD PRESSURE: 70 MMHG | SYSTOLIC BLOOD PRESSURE: 113 MMHG | OXYGEN SATURATION: 96 % | TEMPERATURE: 98 F | RESPIRATION RATE: 18 BRPM | HEART RATE: 88 BPM

## 2023-10-27 PROCEDURE — 93005 ELECTROCARDIOGRAM TRACING: CPT

## 2023-10-27 PROCEDURE — 83540 ASSAY OF IRON: CPT

## 2023-10-27 PROCEDURE — 86900 BLOOD TYPING SEROLOGIC ABO: CPT

## 2023-10-27 PROCEDURE — 86301 IMMUNOASSAY TUMOR CA 19-9: CPT

## 2023-10-27 PROCEDURE — 86704 HEP B CORE ANTIBODY TOTAL: CPT

## 2023-10-27 PROCEDURE — 87389 HIV-1 AG W/HIV-1&-2 AB AG IA: CPT

## 2023-10-27 PROCEDURE — 82570 ASSAY OF URINE CREATININE: CPT

## 2023-10-27 PROCEDURE — 99239 HOSP IP/OBS DSCHRG MGMT >30: CPT | Mod: GC

## 2023-10-27 PROCEDURE — 71250 CT THORAX DX C-: CPT

## 2023-10-27 PROCEDURE — 99152 MOD SED SAME PHYS/QHP 5/>YRS: CPT

## 2023-10-27 PROCEDURE — 93010 ELECTROCARDIOGRAM REPORT: CPT

## 2023-10-27 PROCEDURE — 88341 IMHCHEM/IMCYTCHM EA ADD ANTB: CPT

## 2023-10-27 PROCEDURE — 82105 ALPHA-FETOPROTEIN SERUM: CPT

## 2023-10-27 PROCEDURE — 83550 IRON BINDING TEST: CPT

## 2023-10-27 PROCEDURE — 84156 ASSAY OF PROTEIN URINE: CPT

## 2023-10-27 PROCEDURE — 88342 IMHCHEM/IMCYTCHM 1ST ANTB: CPT

## 2023-10-27 PROCEDURE — 82728 ASSAY OF FERRITIN: CPT

## 2023-10-27 PROCEDURE — 88173 CYTOPATH EVAL FNA REPORT: CPT

## 2023-10-27 PROCEDURE — 84300 ASSAY OF URINE SODIUM: CPT

## 2023-10-27 PROCEDURE — 99153 MOD SED SAME PHYS/QHP EA: CPT

## 2023-10-27 PROCEDURE — 86706 HEP B SURFACE ANTIBODY: CPT

## 2023-10-27 PROCEDURE — 36415 COLL VENOUS BLD VENIPUNCTURE: CPT

## 2023-10-27 PROCEDURE — 86705 HEP B CORE ANTIBODY IGM: CPT

## 2023-10-27 PROCEDURE — 80053 COMPREHEN METABOLIC PANEL: CPT

## 2023-10-27 PROCEDURE — 86304 IMMUNOASSAY TUMOR CA 125: CPT

## 2023-10-27 PROCEDURE — 86709 HEPATITIS A IGM ANTIBODY: CPT

## 2023-10-27 PROCEDURE — 85610 PROTHROMBIN TIME: CPT

## 2023-10-27 PROCEDURE — 85025 COMPLETE CBC W/AUTO DIFF WBC: CPT

## 2023-10-27 PROCEDURE — 85027 COMPLETE CBC AUTOMATED: CPT

## 2023-10-27 PROCEDURE — G1004: CPT

## 2023-10-27 PROCEDURE — 82010 KETONE BODYS QUAN: CPT

## 2023-10-27 PROCEDURE — 82378 CARCINOEMBRYONIC ANTIGEN: CPT

## 2023-10-27 PROCEDURE — 83605 ASSAY OF LACTIC ACID: CPT

## 2023-10-27 PROCEDURE — 83735 ASSAY OF MAGNESIUM: CPT

## 2023-10-27 PROCEDURE — 86901 BLOOD TYPING SEROLOGIC RH(D): CPT

## 2023-10-27 PROCEDURE — 84100 ASSAY OF PHOSPHORUS: CPT

## 2023-10-27 PROCEDURE — 88305 TISSUE EXAM BY PATHOLOGIST: CPT

## 2023-10-27 PROCEDURE — 87340 HEPATITIS B SURFACE AG IA: CPT

## 2023-10-27 PROCEDURE — 99285 EMERGENCY DEPT VISIT HI MDM: CPT

## 2023-10-27 PROCEDURE — 86803 HEPATITIS C AB TEST: CPT

## 2023-10-27 PROCEDURE — 80048 BASIC METABOLIC PNL TOTAL CA: CPT

## 2023-10-27 PROCEDURE — 47000 NEEDLE BIOPSY OF LIVER PERQ: CPT

## 2023-10-27 PROCEDURE — 76942 ECHO GUIDE FOR BIOPSY: CPT

## 2023-10-27 PROCEDURE — 74177 CT ABD & PELVIS W/CONTRAST: CPT | Mod: MG

## 2023-10-27 PROCEDURE — 83690 ASSAY OF LIPASE: CPT

## 2023-10-27 PROCEDURE — 86850 RBC ANTIBODY SCREEN: CPT

## 2023-10-27 PROCEDURE — 81001 URINALYSIS AUTO W/SCOPE: CPT

## 2023-10-27 PROCEDURE — 85730 THROMBOPLASTIN TIME PARTIAL: CPT

## 2023-10-27 RX ADMIN — Medication 100 MILLIGRAM(S): at 11:31

## 2023-10-27 RX ADMIN — HYDROMORPHONE HYDROCHLORIDE 2 MILLIGRAM(S): 2 INJECTION INTRAMUSCULAR; INTRAVENOUS; SUBCUTANEOUS at 10:12

## 2023-10-27 RX ADMIN — HYDROMORPHONE HYDROCHLORIDE 2 MILLIGRAM(S): 2 INJECTION INTRAMUSCULAR; INTRAVENOUS; SUBCUTANEOUS at 11:12

## 2023-10-27 RX ADMIN — Medication 1 TABLET(S): at 11:32

## 2023-10-31 ENCOUNTER — APPOINTMENT (OUTPATIENT)
Dept: HEMATOLOGY ONCOLOGY | Facility: CLINIC | Age: 63
End: 2023-10-31
Payer: COMMERCIAL

## 2023-10-31 VITALS
OXYGEN SATURATION: 100 % | SYSTOLIC BLOOD PRESSURE: 118 MMHG | BODY MASS INDEX: 16.89 KG/M2 | HEART RATE: 115 BPM | HEIGHT: 70 IN | WEIGHT: 118 LBS | DIASTOLIC BLOOD PRESSURE: 80 MMHG | RESPIRATION RATE: 16 BRPM | TEMPERATURE: 98.3 F

## 2023-10-31 DIAGNOSIS — K52.1 TOXIC GASTROENTERITIS AND COLITIS: ICD-10-CM

## 2023-10-31 DIAGNOSIS — C20 MALIGNANT NEOPLASM OF RECTUM: ICD-10-CM

## 2023-10-31 DIAGNOSIS — G89.3 NEOPLASM RELATED PAIN (ACUTE) (CHRONIC): ICD-10-CM

## 2023-10-31 DIAGNOSIS — T45.1X5A NAUSEA WITH VOMITING, UNSPECIFIED: ICD-10-CM

## 2023-10-31 DIAGNOSIS — C78.7 MALIGNANT NEOPLASM OF RECTUM: ICD-10-CM

## 2023-10-31 DIAGNOSIS — T45.1X5A TOXIC GASTROENTERITIS AND COLITIS: ICD-10-CM

## 2023-10-31 DIAGNOSIS — R63.4 ABNORMAL WEIGHT LOSS: ICD-10-CM

## 2023-10-31 DIAGNOSIS — R79.89 OTHER SPECIFIED ABNORMAL FINDINGS OF BLOOD CHEMISTRY: ICD-10-CM

## 2023-10-31 DIAGNOSIS — R11.2 NAUSEA WITH VOMITING, UNSPECIFIED: ICD-10-CM

## 2023-10-31 PROCEDURE — 99215 OFFICE O/P EST HI 40 MIN: CPT

## 2023-10-31 RX ORDER — LOPERAMIDE HYDROCHLORIDE 2 MG/1
2 CAPSULE ORAL
Qty: 80 | Refills: 3 | Status: ACTIVE | COMMUNITY
Start: 2023-10-31 | End: 1900-01-01

## 2023-10-31 RX ORDER — CAPECITABINE 500 MG/1
500 TABLET ORAL
Qty: 56 | Refills: 3 | Status: DISCONTINUED | COMMUNITY
Start: 2023-10-31 | End: 2023-10-31

## 2023-10-31 RX ORDER — ONDANSETRON 8 MG/1
8 TABLET, ORALLY DISINTEGRATING ORAL EVERY 8 HOURS
Qty: 60 | Refills: 3 | Status: ACTIVE | COMMUNITY
Start: 2023-10-31 | End: 1900-01-01

## 2023-10-31 RX ORDER — CAPECITABINE 500 MG/1
500 TABLET ORAL
Qty: 56 | Refills: 3 | Status: ACTIVE | COMMUNITY
Start: 2023-10-31 | End: 1900-01-01

## 2023-10-31 RX ORDER — HYDROMORPHONE HYDROCHLORIDE 2 MG/1
2 TABLET ORAL
Refills: 0 | Status: ACTIVE | COMMUNITY

## 2023-10-31 RX ORDER — LIDOCAINE AND PRILOCAINE 25; 25 MG/G; MG/G
2.5-2.5 CREAM TOPICAL
Qty: 2 | Refills: 2 | Status: ACTIVE | COMMUNITY
Start: 2023-10-31 | End: 1900-01-01

## 2023-11-01 ENCOUNTER — APPOINTMENT (OUTPATIENT)
Dept: PALLIATIVE MEDICINE | Facility: CLINIC | Age: 63
End: 2023-11-01

## 2023-11-01 DIAGNOSIS — N17.0 ACUTE KIDNEY FAILURE WITH TUBULAR NECROSIS: ICD-10-CM

## 2023-11-01 DIAGNOSIS — Z41.8 ENCOUNTER FOR OTHER PROCEDURES FOR PURPOSES OTHER THAN REMEDYING HEALTH STATE: ICD-10-CM

## 2023-11-01 DIAGNOSIS — R65.11 SYSTEMIC INFLAMMATORY RESPONSE SYNDROME (SIRS) OF NON-INFECTIOUS ORIGIN WITH ACUTE ORGAN DYSFUNCTION: ICD-10-CM

## 2023-11-01 DIAGNOSIS — R64 CACHEXIA: ICD-10-CM

## 2023-11-01 DIAGNOSIS — C20 MALIGNANT NEOPLASM OF RECTUM: ICD-10-CM

## 2023-11-01 DIAGNOSIS — E43 UNSPECIFIED SEVERE PROTEIN-CALORIE MALNUTRITION: ICD-10-CM

## 2023-11-01 DIAGNOSIS — R91.8 OTHER NONSPECIFIC ABNORMAL FINDING OF LUNG FIELD: ICD-10-CM

## 2023-11-01 DIAGNOSIS — R33.9 RETENTION OF URINE, UNSPECIFIED: ICD-10-CM

## 2023-11-01 DIAGNOSIS — C78.7 SECONDARY MALIGNANT NEOPLASM OF LIVER AND INTRAHEPATIC BILE DUCT: ICD-10-CM

## 2023-11-01 DIAGNOSIS — R18.8 OTHER ASCITES: ICD-10-CM

## 2023-11-01 DIAGNOSIS — T50.8X5A ADVERSE EFFECT OF DIAGNOSTIC AGENTS, INITIAL ENCOUNTER: ICD-10-CM
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
## 2023-11-01 DIAGNOSIS — R59.0 LOCALIZED ENLARGED LYMPH NODES: ICD-10-CM

## 2023-11-02 ENCOUNTER — NON-APPOINTMENT (OUTPATIENT)
Age: 63
End: 2023-11-02

## 2023-11-03 ENCOUNTER — NON-APPOINTMENT (OUTPATIENT)
Age: 63
End: 2023-11-03

## 2023-11-05 ENCOUNTER — NON-APPOINTMENT (OUTPATIENT)
Age: 63
End: 2023-11-05

## 2023-11-08 ENCOUNTER — APPOINTMENT (OUTPATIENT)
Dept: INFUSION THERAPY | Facility: CLINIC | Age: 63
End: 2023-11-08

## 2023-11-08 ENCOUNTER — APPOINTMENT (OUTPATIENT)
Dept: HEMATOLOGY ONCOLOGY | Facility: CLINIC | Age: 63
End: 2023-11-08

## 2023-11-09 ENCOUNTER — APPOINTMENT (OUTPATIENT)
Dept: GASTROENTEROLOGY | Facility: CLINIC | Age: 63
End: 2023-11-09

## 2024-01-08 LAB
ALBUMIN SERPL ELPH-MCNC: 3.4 G/DL
ALP BLD-CCNC: 581 U/L
ALT SERPL-CCNC: 29 U/L
ANION GAP SERPL CALC-SCNC: 13 MMOL/L
AST SERPL-CCNC: 101 U/L
BASOPHILS # BLD AUTO: 0 K/UL
BASOPHILS NFR BLD AUTO: 0 %
BILIRUB SERPL-MCNC: 0.9 MG/DL
BUN SERPL-MCNC: 16 MG/DL
CALCIUM SERPL-MCNC: 9.1 MG/DL
CANCER AG19-9 SERPL-ACNC: 5930 U/ML
CEA SERPL-MCNC: 9094 NG/ML
CHLORIDE SERPL-SCNC: 96 MMOL/L
CO2 SERPL-SCNC: 25 MMOL/L
CREAT SERPL-MCNC: 1.05 MG/DL
EGFR: 81 ML/MIN/1.73M2
EOSINOPHIL # BLD AUTO: 0 K/UL
EOSINOPHIL NFR BLD AUTO: 0 %
GLUCOSE SERPL-MCNC: 95 MG/DL
HCT VFR BLD CALC: 36.4 %
HGB BLD-MCNC: 11 G/DL
LYMPHOCYTES # BLD AUTO: 0.8 K/UL
LYMPHOCYTES NFR BLD AUTO: 6 %
MAN DIFF?: NORMAL
MCHC RBC-ENTMCNC: 25.3 PG
MCHC RBC-ENTMCNC: 30.2 GM/DL
MCV RBC AUTO: 83.7 FL
MONOCYTES # BLD AUTO: 1.49 K/UL
MONOCYTES NFR BLD AUTO: 11.2 %
NEUTROPHILS # BLD AUTO: 11.02 K/UL
NEUTROPHILS NFR BLD AUTO: 82.8 %
PLATELET # BLD AUTO: 414 K/UL
POTASSIUM SERPL-SCNC: 5.6 MMOL/L
PROT SERPL-MCNC: 7.8 G/DL
RBC # BLD: 4.35 M/UL
RBC # FLD: 15.6 %
SODIUM SERPL-SCNC: 134 MMOL/L
WBC # FLD AUTO: 13.31 K/UL

## (undated) DEVICE — FORCEP RADIAL JAW 4 W NDL 2.2MM 2.8MM 240CM ORANGE DISP